# Patient Record
Sex: MALE | Race: WHITE | NOT HISPANIC OR LATINO | URBAN - METROPOLITAN AREA
[De-identification: names, ages, dates, MRNs, and addresses within clinical notes are randomized per-mention and may not be internally consistent; named-entity substitution may affect disease eponyms.]

---

## 2017-02-01 ENCOUNTER — INPATIENT (INPATIENT)
Facility: HOSPITAL | Age: 52
LOS: 3 days | Discharge: HOME | DRG: 378 | End: 2017-02-05
Attending: INTERNAL MEDICINE | Admitting: INTERNAL MEDICINE
Payer: MEDICAID

## 2017-02-01 VITALS
OXYGEN SATURATION: 99 % | TEMPERATURE: 98 F | HEART RATE: 101 BPM | RESPIRATION RATE: 18 BRPM | DIASTOLIC BLOOD PRESSURE: 95 MMHG | SYSTOLIC BLOOD PRESSURE: 148 MMHG

## 2017-02-01 PROCEDURE — 99285 EMERGENCY DEPT VISIT HI MDM: CPT | Mod: 25

## 2017-02-01 PROCEDURE — 93010 ELECTROCARDIOGRAM REPORT: CPT

## 2017-02-01 RX ORDER — PANTOPRAZOLE SODIUM 20 MG/1
40 TABLET, DELAYED RELEASE ORAL ONCE
Qty: 0 | Refills: 0 | Status: COMPLETED | OUTPATIENT
Start: 2017-02-01 | End: 2017-02-01

## 2017-02-01 NOTE — ED PROVIDER NOTE - OBJECTIVE STATEMENT
51M with HLD and PMH of GERD presents to [facility] ED with 2 day history of black, tarry stools. Patient noticed dark stools 2 days ago mixed with normal stool. Patient reports that at 2 AM this morning he began to notice liquid, black, tarry stool and has had 8 bowel movements of the same consistency since then. Patient reports no associated n/v or BRBPR. Patient reports that he has been tolerating a diet, but does endorse feeling lightheaded and fatigued today. Patient reports his last drink was about 10 days ago while on a business trip to [facility]. He used to drink and smoke regularly years ago, but stopped due to problems with GERD. Reports taking Prilosec in the past, but no longer takes it and no longer experiences symptoms of acid reflux. Patient reports that he had a CT scan in the past but never had an endoscopy. Reports normal annual exam with PCP 6 months ago.

## 2017-02-01 NOTE — ED ADULT NURSE NOTE - OBJECTIVE STATEMENT
pt received into spot 4 A&Ox3 ambulatory appears comfortable complaining of dark black stools today. States he's gone to the bathroom 7-8 times since 2pm today. Denies abd pain or blood thinner use no hx of abd surgeries no CP SOB. Pt admits to generalized weakness. 20G LAC labs sent awaiting MD schulz will monitor and reassess, pt in NAD

## 2017-02-01 NOTE — ED ADULT TRIAGE NOTE - CHIEF COMPLAINT QUOTE
pt c/o black stools since last night , pt is also c/o epigastric  pain , when pt asked if any history of ulcers pt states " I have been having  symptoms of ulcers ( indigestion , belching ), pt denies any nausea or vomiting

## 2017-02-01 NOTE — ED PROVIDER NOTE - ATTENDING CONTRIBUTION TO CARE
2 days of melena.  prior drinker/ history of gerd.  concern for gastritis/bleeding ulcer.  hemodynamically stable.  will start protonix, admit for trending hgb and GI consult/ possible endoscopy.  abd soft, non tender, well appearing

## 2017-02-01 NOTE — ED PROVIDER NOTE - MEDICAL DECISION MAKING DETAILS
51M w/ PMH of HLD and GERD presents with 2 day history of black tarry stools concerning for upper GI bleed 2/2 to ulcer   - PMH of GERD, 2 day history of melena and heme-occult positive on exam, highly suspicious for upper GI bleed  - Less likely to be hemorrhoids or lower GI source of bleed given tarry nature of stools, no BRBPR, and no masses palpated on rectal exam  - Admit to medicine for monitoring and further evaluation of GI bleeding   - Hemodynamically stable, can admit to regional level of care

## 2017-02-02 DIAGNOSIS — E78.5 HYPERLIPIDEMIA, UNSPECIFIED: ICD-10-CM

## 2017-02-02 DIAGNOSIS — K92.1 MELENA: ICD-10-CM

## 2017-02-02 LAB
ANION GAP SERPL CALC-SCNC: 8 MMOL/L — LOW (ref 9–16)
BLD GP AB SCN SERPL QL: NEGATIVE — SIGNIFICANT CHANGE UP
BLD GP AB SCN SERPL QL: NEGATIVE — SIGNIFICANT CHANGE UP
BUN SERPL-MCNC: 33 MG/DL — HIGH (ref 7–23)
CALCIUM SERPL-MCNC: 7.7 MG/DL — LOW (ref 8.5–10.5)
CHLORIDE SERPL-SCNC: 109 MMOL/L — HIGH (ref 96–108)
CHOLEST SERPL-MCNC: 148 MG/DL — SIGNIFICANT CHANGE UP
CO2 SERPL-SCNC: 24 MMOL/L — SIGNIFICANT CHANGE UP (ref 22–31)
CREAT SERPL-MCNC: 0.9 MG/DL — SIGNIFICANT CHANGE UP (ref 0.5–1.3)
FERRITIN SERPL-MCNC: 52.9 NG/ML — SIGNIFICANT CHANGE UP (ref 26–388)
FOLATE SERPL-MCNC: 11.9 NG/ML — SIGNIFICANT CHANGE UP (ref 4.8–24.2)
GLUCOSE SERPL-MCNC: 90 MG/DL — SIGNIFICANT CHANGE UP (ref 70–99)
HCT VFR BLD CALC: 28.3 % — LOW (ref 39–50)
HCT VFR BLD CALC: 29.9 % — LOW (ref 39–50)
HCT VFR BLD CALC: 32.5 % — LOW (ref 39–50)
HDLC SERPL-MCNC: 31 MG/DL — LOW
HGB BLD-MCNC: 10.2 G/DL — LOW (ref 13–17)
HGB BLD-MCNC: 11 G/DL — LOW (ref 13–17)
HGB BLD-MCNC: 9.4 G/DL — LOW (ref 13–17)
IRON SATN MFR SERPL: 25 % — LOW (ref 26–39)
IRON SATN MFR SERPL: 63 UG/DL — LOW (ref 65–175)
LIPID PNL WITH DIRECT LDL SERPL: 90 MG/DL — SIGNIFICANT CHANGE UP
MAGNESIUM SERPL-MCNC: 1.9 MG/DL — SIGNIFICANT CHANGE UP (ref 1.6–2.4)
MCHC RBC-ENTMCNC: 29.7 PG — SIGNIFICANT CHANGE UP (ref 27–34)
MCHC RBC-ENTMCNC: 29.9 PG — SIGNIFICANT CHANGE UP (ref 27–34)
MCHC RBC-ENTMCNC: 30 PG — SIGNIFICANT CHANGE UP (ref 27–34)
MCHC RBC-ENTMCNC: 33.2 G/DL — SIGNIFICANT CHANGE UP (ref 32–36)
MCHC RBC-ENTMCNC: 33.8 G/DL — SIGNIFICANT CHANGE UP (ref 32–36)
MCHC RBC-ENTMCNC: 34.1 G/DL — SIGNIFICANT CHANGE UP (ref 32–36)
MCV RBC AUTO: 87.7 FL — SIGNIFICANT CHANGE UP (ref 80–100)
MCV RBC AUTO: 88.6 FL — SIGNIFICANT CHANGE UP (ref 80–100)
MCV RBC AUTO: 89.6 FL — SIGNIFICANT CHANGE UP (ref 80–100)
PLATELET # BLD AUTO: 165 K/UL — SIGNIFICANT CHANGE UP (ref 150–400)
PLATELET # BLD AUTO: 177 K/UL — SIGNIFICANT CHANGE UP (ref 150–400)
PLATELET # BLD AUTO: 209 K/UL — SIGNIFICANT CHANGE UP (ref 150–400)
POTASSIUM SERPL-MCNC: 3.7 MMOL/L — SIGNIFICANT CHANGE UP (ref 3.5–5.3)
POTASSIUM SERPL-SCNC: 3.7 MMOL/L — SIGNIFICANT CHANGE UP (ref 3.5–5.3)
RBC # BLD: 3.16 M/UL — LOW (ref 4.2–5.8)
RBC # BLD: 3.41 M/UL — LOW (ref 4.2–5.8)
RBC # BLD: 3.67 M/UL — LOW (ref 4.2–5.8)
RBC # FLD: 13 % — SIGNIFICANT CHANGE UP (ref 10.3–16.9)
RBC # FLD: 13.1 % — SIGNIFICANT CHANGE UP (ref 10.3–16.9)
RBC # FLD: 13.4 % — SIGNIFICANT CHANGE UP (ref 10.3–16.9)
RH IG SCN BLD-IMP: POSITIVE — SIGNIFICANT CHANGE UP
RH IG SCN BLD-IMP: POSITIVE — SIGNIFICANT CHANGE UP
SODIUM SERPL-SCNC: 141 MMOL/L — SIGNIFICANT CHANGE UP (ref 135–145)
TIBC SERPL-MCNC: 249 UG/DL — LOW (ref 250–450)
TOTAL CHOLESTEROL/HDL RATIO MEASUREMENT: 4.8 RATIO — SIGNIFICANT CHANGE UP
TRIGL SERPL-MCNC: 136 MG/DL — SIGNIFICANT CHANGE UP
TSH SERPL-MCNC: 1.8 UIU/ML — SIGNIFICANT CHANGE UP (ref 0.35–4.94)
VIT B12 SERPL-MCNC: 514 PG/ML — SIGNIFICANT CHANGE UP (ref 243–894)
WBC # BLD: 5 K/UL — SIGNIFICANT CHANGE UP (ref 3.8–10.5)
WBC # BLD: 5 K/UL — SIGNIFICANT CHANGE UP (ref 3.8–10.5)
WBC # BLD: 6.5 K/UL — SIGNIFICANT CHANGE UP (ref 3.8–10.5)
WBC # FLD AUTO: 5 K/UL — SIGNIFICANT CHANGE UP (ref 3.8–10.5)
WBC # FLD AUTO: 5 K/UL — SIGNIFICANT CHANGE UP (ref 3.8–10.5)
WBC # FLD AUTO: 6.5 K/UL — SIGNIFICANT CHANGE UP (ref 3.8–10.5)

## 2017-02-02 PROCEDURE — 93010 ELECTROCARDIOGRAM REPORT: CPT

## 2017-02-02 PROCEDURE — 44366 SMALL BOWEL ENDOSCOPY: CPT | Mod: GC

## 2017-02-02 PROCEDURE — 99255 IP/OBS CONSLTJ NEW/EST HI 80: CPT | Mod: 25,GC

## 2017-02-02 PROCEDURE — 99223 1ST HOSP IP/OBS HIGH 75: CPT

## 2017-02-02 RX ORDER — SODIUM CHLORIDE 9 MG/ML
1000 INJECTION INTRAMUSCULAR; INTRAVENOUS; SUBCUTANEOUS
Qty: 0 | Refills: 0 | Status: DISCONTINUED | OUTPATIENT
Start: 2017-02-02 | End: 2017-02-02

## 2017-02-02 RX ORDER — PANTOPRAZOLE SODIUM 20 MG/1
40 TABLET, DELAYED RELEASE ORAL
Qty: 0 | Refills: 0 | Status: DISCONTINUED | OUTPATIENT
Start: 2017-02-02 | End: 2017-02-02

## 2017-02-02 RX ORDER — PANTOPRAZOLE SODIUM 20 MG/1
8 TABLET, DELAYED RELEASE ORAL
Qty: 80 | Refills: 0 | Status: DISCONTINUED | OUTPATIENT
Start: 2017-02-02 | End: 2017-02-04

## 2017-02-02 RX ORDER — SODIUM CHLORIDE 9 MG/ML
1000 INJECTION INTRAMUSCULAR; INTRAVENOUS; SUBCUTANEOUS ONCE
Qty: 0 | Refills: 0 | Status: COMPLETED | OUTPATIENT
Start: 2017-02-02 | End: 2017-02-02

## 2017-02-02 RX ORDER — SODIUM CHLORIDE 9 MG/ML
1000 INJECTION INTRAMUSCULAR; INTRAVENOUS; SUBCUTANEOUS
Qty: 0 | Refills: 0 | Status: DISCONTINUED | OUTPATIENT
Start: 2017-02-02 | End: 2017-02-04

## 2017-02-02 RX ADMIN — PANTOPRAZOLE SODIUM 40 MILLIGRAM(S): 20 TABLET, DELAYED RELEASE ORAL at 00:11

## 2017-02-02 RX ADMIN — SODIUM CHLORIDE 2000 MILLILITER(S): 9 INJECTION INTRAMUSCULAR; INTRAVENOUS; SUBCUTANEOUS at 01:25

## 2017-02-02 RX ADMIN — PANTOPRAZOLE SODIUM 10 MG/HR: 20 TABLET, DELAYED RELEASE ORAL at 12:48

## 2017-02-02 RX ADMIN — SODIUM CHLORIDE 125 MILLILITER(S): 9 INJECTION INTRAMUSCULAR; INTRAVENOUS; SUBCUTANEOUS at 03:16

## 2017-02-02 RX ADMIN — SODIUM CHLORIDE 125 MILLILITER(S): 9 INJECTION INTRAMUSCULAR; INTRAVENOUS; SUBCUTANEOUS at 12:49

## 2017-02-02 RX ADMIN — PANTOPRAZOLE SODIUM 10 MG/HR: 20 TABLET, DELAYED RELEASE ORAL at 03:17

## 2017-02-02 RX ADMIN — SODIUM CHLORIDE 125 MILLILITER(S): 9 INJECTION INTRAMUSCULAR; INTRAVENOUS; SUBCUTANEOUS at 00:11

## 2017-02-02 NOTE — H&P ADULT. - PROBLEM SELECTOR PLAN 2
Pt reports being off crestor x months. Self stopped.  - lipid panel in AM    PPx. SCDs only    FEN. IVF as above. Replete electrlytes as above. NPO    Dispo. Pending clinical improvement Pt reports being off crestor x months. Self stopped.  - lipid panel in AM    PPx. SCDs only    FEN. IVF as above. Replete electrolytes as above. NPO    Dispo. Pending clinical improvement

## 2017-02-02 NOTE — H&P ADULT. - ASSESSMENT
52 yo M PMH HLD (self stopped crestor 5 months ago), GERD (dx 5 years ago, on prilosec at that time, stopped all meds 5 years ago, changed lifestyle/no coffee/etoh to tx GERD), no recent GIB or GERD Sx, remote heavy etoh/drug use late 1990s and 2/2 PTSD after 9/2011 (stopped all illicit substances > 10 years ago) presents with black stool x 1 day. Orthostatics negative though HR increased 73 to 91 per report. Hgb 12.8. BUN in 40s range. Guaic + in ED, black stool. NAD on exam.

## 2017-02-02 NOTE — PROGRESS NOTE ADULT - PROBLEM SELECTOR PLAN 2
GI consult, pending recc's - previous diagnosis of hyperlipidemia  - recently self-discontinued crestor due to insurance issues  - continue w crestor and follow up w PMD as needed

## 2017-02-02 NOTE — H&P ADULT. - ATTENDING COMMENTS
51M hx HLD, GERD, generally healthy otherwise who presents with 1d history of melena with black, formed stools. Pt had about 5 episodes on day of presentation. No other symptoms except mild fatigue. No NSAID or AC use. He did drink relatively heavily on a recent business trip, which he does not normally do (was a remote heavy EtOH, drug abuser). In ED, pt had guaiac + black stool on rectal exam. Orthostatics borderline negative.  Vitals and exam as above  Labs reviewed  A/P: 51M with melena and suspected UGIB. Will continue Protonix gtt and check serial CBCs. Active T&S. No need for transfusion at this time. GI consult for possible EGD today.

## 2017-02-02 NOTE — CONSULT NOTE ADULT - SUBJECTIVE AND OBJECTIVE BOX
HPI:    52 yo M PMH HLD , GERD (dx 5 years ago, on prilosec self d/c'd 2/2 to lifestyle mods  presents with black stool x 1 day. Experienced about 3 episodes black stool in past day; started at streaks of black and progressed to black tarry. Denies abd, n/v. BRBPR, no diarrhea. Endorses  +mild fatigue over last day, which has since resolved. Pt does indorse +intermittant esophageal pain in the pasts but none currently. Pt describes this as pain in the "esophagus" that he recognizes from his past GERD Sx.. Does not drink coffee/etoh regularly. No NSAID or AC use.     Of note, In past week he returned from business trip to [facility] where he engaged in heavy drinking etoh which is unusual for him; reports 1 bottle of wine nightly though unclear exact amount x 2-3 nights during trip. He does not drink etoh heavily or regularly and describes this as anomalous for him. 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 Smoked 1 pack of cigarettes during month of December 2016, otherwise nonsmoker. Last meal 2/1 11:00 am.     hgb 12.8 --> 11.0 --> 10.2      PAST MEDICAL & SURGICAL HISTORY:  GERD (gastroesophageal reflux disease)  Hyperlipidemia          MEDICATIONS  (STANDING):  sodium chloride 0.9%. 1000milliLiter(s) IV Continuous <Continuous>  pantoprazole Infusion 8mG/Hr IV Continuous <Continuous>    MEDICATIONS  (PRN):      Allergies    penicillin (Unknown)    Intolerances        SOCIAL HISTORY: social drinker/smoker, former drug use.     FAMILY HISTORY:  Family history of Hodgkins disease (Mother)      Vital Signs Last 24 Hrs  T(C): 36.8, Max: 36.9 (02-01 @ 22:43)  T(F): 98.3, Max: 98.4 (02-01 @ 22:43)  HR: 72 (68 - 101)  BP: 121/68 (100/61 - 148/95)  BP(mean): --  RR: 16 (16 - 18)  SpO2: 96% (96% - 99%)    PHYSICAL EXAM:    GEN: resting comfortably in bed, nad, AOx3  HEENT: anicteric, no pallor  CHEST: no w/r/r  CVS: no m/r/g  ABD: soft, nt, nd, bs+  RECTAL: +melena no hemorrhoids                LABS:                        10.2   5.0   )-----------( 177      ( 02 Feb 2017 06:18 )             29.9     02 Feb 2017 06:18    141    |  109    |  33     ----------------------------<  90     3.7     |  24     |  0.90     Ca    7.7        02 Feb 2017 06:18  Mg     1.9       02 Feb 2017 06:18    TPro  6.8    /  Alb  3.5    /  TBili  0.3    /  DBili  x      /  AST  23     /  ALT  44     /  AlkPhos  76     01 Feb 2017 23:12    PT/INR - ( 01 Feb 2017 23:12 )   PT: 11.0 sec;   INR: 0.99          PTT - ( 01 Feb 2017 23:12 )  PTT:27.8 sec      RADIOLOGY & ADDITIONAL STUDIES:

## 2017-02-02 NOTE — PROGRESS NOTE ADULT - SUBJECTIVE AND OBJECTIVE BOX
INTERVAL HPI/OVERNIGHT EVENTS:  No changes overnight. Vitals WNL.    VITAL SIGNS:  T(F): 98.3  HR: 72  BP: 121/68  RR: 16  SpO2: 96%  Wt(kg): --    PHYSICAL EXAM:    Constitutional: well-appearing, well-nourished  Eyes: PERRLA, EOM intact  ENMT: moist oral mucosa,   Neck: supple, no JVD  Respiratory: lungs CTAB, no ronchi, rales, wheezing  Cardiovascular: RRR, normal s1s2  Gastrointestinal: + BS, soft, non tender, non distended, no guarding or rebound, no hepatomegaly, no splenomegaly  Extremities:   Vascular:   Neurological: no focal deficits, AAO x3  Musculoskeletal: ROM intact, no joint swelling or deformity    MEDICATIONS  (STANDING):  sodium chloride 0.9%. 1000milliLiter(s) IV Continuous <Continuous>  pantoprazole Infusion 8mG/Hr IV Continuous <Continuous>    MEDICATIONS  (PRN):      Allergies    penicillin (Unknown)    Intolerances        LABS:                        10.2   5.0   )-----------( 177      ( 02 Feb 2017 06:18 )             29.9     02 Feb 2017 06:18    141    |  109    |  33     ----------------------------<  90     3.7     |  24     |  0.90     Ca    7.7        02 Feb 2017 06:18  Mg     1.9       02 Feb 2017 06:18    TPro  6.8    /  Alb  3.5    /  TBili  0.3    /  DBili  x      /  AST  23     /  ALT  44     /  AlkPhos  76     01 Feb 2017 23:12    PT/INR - ( 01 Feb 2017 23:12 )   PT: 11.0 sec;   INR: 0.99          PTT - ( 01 Feb 2017 23:12 )  PTT:27.8 sec      RADIOLOGY & ADDITIONAL TESTS: S  INTERVAL HPI/OVERNIGHT EVENTS:  No changes overnight. Vitals WNL.    This morning, denies any further episodes of melena. Esophageal pain resolved. No fevers/chills, CP, SOB, palpitations, n/v, palpitations, pain.  ;  O  VITAL SIGNS:  T(F): 98.3  HR: 72  BP: 121/68  RR: 16  SpO2: 96%  Wt(kg): --    PHYSICAL EXAM:    Constitutional: well-appearing, well-nourished  Eyes: PERRLA, EOM intact  ENMT: moist oral mucosa,   Neck: supple, no JVD  Respiratory: lungs CTAB, no ronchi, rales, wheezing  Cardiovascular: RRR, normal s1s2  Gastrointestinal: + BS, soft, non tender, non distended, no guarding or rebound, no hepatomegaly, no splenomegaly  Extremities: warm, well-perfused, no edema, clubbing or cyanosis  Neurological: no focal deficits, AAO x3  Musculoskeletal: ROM intact, no joint swelling or deformity    MEDICATIONS  (STANDING):  sodium chloride 0.9%. 1000milliLiter(s) IV Continuous <Continuous>  pantoprazole Infusion 8mG/Hr IV Continuous <Continuous>    MEDICATIONS  (PRN):      Allergies    penicillin (Unknown)    Intolerances        LABS:                        10.2   5.0   )-----------( 177      ( 02 Feb 2017 06:18 )             29.9     02 Feb 2017 06:18    141    |  109    |  33     ----------------------------<  90     3.7     |  24     |  0.90     Ca    7.7        02 Feb 2017 06:18  Mg     1.9       02 Feb 2017 06:18    TPro  6.8    /  Alb  3.5    /  TBili  0.3    /  DBili  x      /  AST  23     /  ALT  44     /  AlkPhos  76     01 Feb 2017 23:12    PT/INR - ( 01 Feb 2017 23:12 )   PT: 11.0 sec;   INR: 0.99          PTT - ( 01 Feb 2017 23:12 )  PTT:27.8 sec

## 2017-02-02 NOTE — PROGRESS NOTE ADULT - ASSESSMENT
Mr. Smith is a 50yo M w hx of GERD and HLD who was admitted for melena of unclear etiology. Mr. Smith is a 52yo M w hx of GERD and HLD who was admitted for melena possibly due to gastritis vs ulcer.

## 2017-02-02 NOTE — H&P ADULT. - PROBLEM SELECTOR PLAN 1
Concerning for UGIB especially given H/O etoh and GERD. Labs reveal normocytic anemia, hgb 12.8. Orthostatics negative, NAD on exam. Initial tachycardia may be 2/2 dehydration as pt dry on exam vs bleed. Present exam suggests resolving UGIB, unlikely brisk active bleed.  - GI cx in AM  - trend CBC  - maintain 2 type and screen, 2 large bore IV  - IV PPI bid  - NPO  - c/w maintenance IVF overnight Concerning for UGIB especially given H/O etoh and GERD. Labs reveal normocytic anemia, hgb 12.8. Elevated BUN suggests bleed. Orthostatics negative, NAD on exam. Initial tachycardia may be 2/2 dehydration as pt dry on exam vs bleed. Present exam suggests resolving UGIB, unlikely brisk active bleed but maintain high index of suspicion.  - GI cx in AM  - trend CBC overnight  - maintain 2 type and screen, 2 large bore IV  - PPI gtt  - NPO  - c/w maintenance IVF overnight

## 2017-02-02 NOTE — PROGRESS NOTE ADULT - PROBLEM SELECTOR PLAN 1
- presented w 1 day hx of melena; VS stable  - etiology likely 2/2 to gastritis vs peptic ulcer  - GI consulted; EGD planned for today  - maintain type and screen; monitor hemoglobin - presented w 1 day hx of melena; VS stable  - etiology likely 2/2 to gastritis vs peptic ulcer  - GI consulted; EGD planned for today  - maintain type and screen; monitor hemoglobin (12.8 --> 11.0 --> 10.2)

## 2017-02-02 NOTE — CONSULT NOTE ADULT - ASSESSMENT
51 year old male with hx of GERD (treated with lifestyle modification) with recent binge drinking presents with melena x1 day and elevated BUN.  Concern for UGIB, will plan for endoscopic evaluation.     1. UGIB  -NPO  -IV PPI drip  -Monitor CBCs, transfuse to hgb >7  -Avoid NSAIDS  -EGD today    GI following

## 2017-02-02 NOTE — H&P ADULT. - HISTORY OF PRESENT ILLNESS
52 yo M PMH HLD (self stopped crestor 5 months ago), GERD (dx 5 years ago, on prilosec at that time, stopped all meds 5 years ago, changed lifestyle/no coffee/etoh to tx GERD), no recent GIB or GERD Sx, remote heavy etoh/drug use late 1990s and 2/2 PTSD after 9/2011 (stopped all illicit substances > 10 years ago) presents with black stool x 1 day. Experienced about 5 episodes black stool in past day, no BRBPR, no diarrhea, formed stool. +mild fatigue in this timeperiod which he states resolved with IVF in ED, no longer fatigued. No HA/dizziness/CP/SOB/abd pain/n/v/diarrhea/dysuria/ No constipation. No recent illness. Does not drink coffee/etoh regularly. No NSAID or AC use. In past week he returned from business trip to [facility] where he engaged in heavy drinking etoh which is unusual for him; reports 1 bottle of wine nightly x 2-3 nights, walking home mildly inebriated fo hotel room. He does not drink etoh heavily or regularly and describes this as anomalous for him. 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 Smoked 1 pack of cigarrettes during month of December 2016, otherwise nonsmoker.    In ED: 98.4, 101 to 84, 148/95 to 120/70, 18, 99% RA. Orthostatics negative though HR increased 73 to 91 per report. Hgb 12.8. BUN in 40s range. Received IV protonix 40,  cc/hr, 1L IV NS bolus. Guaic + in ED, black stool. 50 yo M PMH HLD (self stopped crestor 5 months ago), GERD (dx 5 years ago, on prilosec at that time, stopped all meds 5 years ago, changed lifestyle/no coffee/etoh to tx GERD), no recent GIB or GERD Sx, remote heavy etoh/drug use late 1990s and 2/2 PTSD after 9/2011 (stopped all illicit substances > 10 years ago) presents with black stool x 1 day. Experienced about 5 episodes black stool in past day, no BRBPR, no diarrhea, formed stool. +mild fatigue in this timeperiod which he states resolved with IVF in ED, no longer fatigued. +intermittant esophageal pain during bowel movements, none currently. Pt describes this as pain in the "esophagus" that he recognizes from his past GERD Sx. No HA/dizziness/CP/SOB/abd pain/n/v/diarrhea/dysuria/ No constipation. No recent illness. Does not drink coffee/etoh regularly. No NSAID or AC use. In past week he returned from business trip to [facility] where he engaged in heavy drinking etoh which is unusual for him; reports 1 bottle of wine nightly though unclear exact amount x 2-3 nights during trip, walking home inebriated to hotel room. He does not drink etoh heavily or regularly and describes this as anomalous for him. 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 Smoked 1 pack of cigarettes during month of December 2016, otherwise nonsmoker.    In ED: 98.4, 101 to 84, 148/95 to 120/70, 18, 99% RA. Orthostatics negative though HR increased 73 to 91 per report. Hgb 12.8. BUN in 40s range. Received IV protonix 40,  cc/hr, 1L IV NS bolus. Guaic + in ED, black stool.

## 2017-02-03 DIAGNOSIS — Z29.9 ENCOUNTER FOR PROPHYLACTIC MEASURES, UNSPECIFIED: ICD-10-CM

## 2017-02-03 LAB
ANION GAP SERPL CALC-SCNC: 5 MMOL/L — LOW (ref 9–16)
BUN SERPL-MCNC: 12 MG/DL — SIGNIFICANT CHANGE UP (ref 7–23)
CALCIUM SERPL-MCNC: 8.3 MG/DL — LOW (ref 8.5–10.5)
CHLORIDE SERPL-SCNC: 110 MMOL/L — HIGH (ref 96–108)
CO2 SERPL-SCNC: 28 MMOL/L — SIGNIFICANT CHANGE UP (ref 22–31)
CREAT SERPL-MCNC: 1.12 MG/DL — SIGNIFICANT CHANGE UP (ref 0.5–1.3)
GLUCOSE SERPL-MCNC: 91 MG/DL — SIGNIFICANT CHANGE UP (ref 70–99)
HCT VFR BLD CALC: 26.3 % — LOW (ref 39–50)
HCT VFR BLD CALC: 27.3 % — LOW (ref 39–50)
HGB BLD-MCNC: 8.9 G/DL — LOW (ref 13–17)
HGB BLD-MCNC: 9.1 G/DL — LOW (ref 13–17)
MCHC RBC-ENTMCNC: 29.8 PG — SIGNIFICANT CHANGE UP (ref 27–34)
MCHC RBC-ENTMCNC: 30.1 PG — SIGNIFICANT CHANGE UP (ref 27–34)
MCHC RBC-ENTMCNC: 33.3 G/DL — SIGNIFICANT CHANGE UP (ref 32–36)
MCHC RBC-ENTMCNC: 33.8 G/DL — SIGNIFICANT CHANGE UP (ref 32–36)
MCV RBC AUTO: 88.9 FL — SIGNIFICANT CHANGE UP (ref 80–100)
MCV RBC AUTO: 89.5 FL — SIGNIFICANT CHANGE UP (ref 80–100)
PLATELET # BLD AUTO: 162 K/UL — SIGNIFICANT CHANGE UP (ref 150–400)
PLATELET # BLD AUTO: 171 K/UL — SIGNIFICANT CHANGE UP (ref 150–400)
POTASSIUM SERPL-MCNC: 3.6 MMOL/L — SIGNIFICANT CHANGE UP (ref 3.5–5.3)
POTASSIUM SERPL-SCNC: 3.6 MMOL/L — SIGNIFICANT CHANGE UP (ref 3.5–5.3)
RBC # BLD: 2.96 M/UL — LOW (ref 4.2–5.8)
RBC # BLD: 3.05 M/UL — LOW (ref 4.2–5.8)
RBC # FLD: 13.3 % — SIGNIFICANT CHANGE UP (ref 10.3–16.9)
RBC # FLD: 13.4 % — SIGNIFICANT CHANGE UP (ref 10.3–16.9)
SODIUM SERPL-SCNC: 143 MMOL/L — SIGNIFICANT CHANGE UP (ref 135–145)
WBC # BLD: 3.1 K/UL — LOW (ref 3.8–10.5)
WBC # BLD: 3.5 K/UL — LOW (ref 3.8–10.5)
WBC # FLD AUTO: 3.1 K/UL — LOW (ref 3.8–10.5)
WBC # FLD AUTO: 3.5 K/UL — LOW (ref 3.8–10.5)

## 2017-02-03 PROCEDURE — 99233 SBSQ HOSP IP/OBS HIGH 50: CPT

## 2017-02-03 RX ORDER — SODIUM CHLORIDE 9 MG/ML
500 INJECTION INTRAMUSCULAR; INTRAVENOUS; SUBCUTANEOUS ONCE
Qty: 0 | Refills: 0 | Status: COMPLETED | OUTPATIENT
Start: 2017-02-03 | End: 2017-02-03

## 2017-02-03 RX ADMIN — PANTOPRAZOLE SODIUM 10 MG/HR: 20 TABLET, DELAYED RELEASE ORAL at 05:30

## 2017-02-03 RX ADMIN — SODIUM CHLORIDE 125 MILLILITER(S): 9 INJECTION INTRAMUSCULAR; INTRAVENOUS; SUBCUTANEOUS at 05:30

## 2017-02-03 RX ADMIN — PANTOPRAZOLE SODIUM 10 MG/HR: 20 TABLET, DELAYED RELEASE ORAL at 12:15

## 2017-02-03 RX ADMIN — SODIUM CHLORIDE 2000 MILLILITER(S): 9 INJECTION INTRAMUSCULAR; INTRAVENOUS; SUBCUTANEOUS at 05:30

## 2017-02-03 NOTE — DISCHARGE NOTE ADULT - PATIENT PORTAL LINK FT
“You can access the Follow[employer] Patient Portal, offered by [facility], by registering with the following website: [email or web address]”

## 2017-02-03 NOTE — DISCHARGE NOTE ADULT - HOSPITAL COURSE
Mr. Smith is a 52yo M w no significant medical hx who presented to ED w reports of black tarry stool and was subsequently found to have an actively oozing ulcer. Ulcer was clipped by GI and pt was monitored for bleeding. Diet was advanced as tolerated. 50yo M w hypothyroidism, HLD, GERD, remote heavy etoh/drug use late 1990s 2/2 PTSD after 9/2011 (stopped all illicit substances > 10 years ago) presented with black stool x 1 day. HD stable, afebrile on arrival, admitted to [facility]. Guaiac positive black stool on exam. Labs remarkable for elevated BUN and Hgb of 12.8. Started on protonix, s/p EGD with GI, found to have actively oozing ulcer (forest class 1B). Ulcer was clipped by GI and pt was monitored for bleeding. Diet was advanced as tolerated. H/H stable. Ready for discharge home with outpatient GI follow up. 52yo M w hypothyroidism, HLD, GERD, remote heavy etoh/drug use late 1990s 2/2 PTSD after 9/2011 (stopped all illicit substances > 10 years ago) presented with black stool x 1 day. HD stable, afebrile on arrival, admitted to [facility]. Guaiac positive black stool on exam. Labs remarkable for elevated BUN and Hgb of 12.8. Started on protonix, s/p EGD with GI, found to have actively oozing ulcer (forest class 1B). Ulcer was clipped by GI and pt was monitored for bleeding. Patient tolerating full diet. H/H stable, no BM after EGD. H.pylori IgA pending, ready for discharge home on oral PPI with outpatient GI follow up. 50yo M w hypothyroidism, HLD, GERD, remote heavy etoh/drug use late 1990s 2/2 PTSD after 9/2011 (stopped all illicit substances > 10 years ago) presented with black stool x 1 day. HD stable, afebrile on arrival, admitted to [facility]. Guaiac positive black stool on exam. Labs remarkable for elevated BUN and Hgb of 12.8. Started on protonix, s/p EGD with GI, found to have actively oozing ulcer (forest class 1B). Ulcer was clipped by GI and pt was monitored for bleeding. Patient tolerating full diet. H/H stable, no BM after EGD. H.pylori IgA pending, ready for discharge home on oral PPI BID with outpatient GI follow up. 50yo M w hypothyroidism, HLD, GERD, remote heavy etoh/drug use late 1990s 2/2 PTSD after 9/2011 (stopped all illicit substances > 10 years ago) presented with black stool x 1 day. HD stable, afebrile on arrival, admitted to [facility]. Guaiac positive black stool on exam. Labs remarkable for elevated BUN and Hgb of 12.8. Started on protonix, s/p EGD with GI, found to have actively oozing ulcer (forest class 1B). Ulcer was clipped by GI and pt was monitored for bleeding. Patient tolerating full diet. H/H stable, no BM after EGD. H.pylori IgA pending, ready for discharge home on oral PPI BID with outpatient GI follow up in 1 week with Dr. Connolly

## 2017-02-03 NOTE — DISCHARGE NOTE ADULT - MEDICATION SUMMARY - MEDICATIONS TO TAKE
I will START or STAY ON the medications listed below when I get home from the hospital:    pantoprazole 40 mg oral delayed release tablet  -- 1 tab(s) by mouth once a day (before a meal)  -- Indication: For Gastric ulcers I will START or STAY ON the medications listed below when I get home from the hospital:    pantoprazole 40 mg oral delayed release tablet  -- 1 tab(s) by mouth 2 times a day  -- Indication: For Duodenal Ulcers

## 2017-02-03 NOTE — PROGRESS NOTE ADULT - ASSESSMENT
51 year old male with hx of GERD (treated with lifestyle modification) with recent binge drinking presents with melena x1 day and elevated BUN.  S/P EGD with multiple gastric and duodenal ulcers s/p hemoclip placement of oozing ulcer (forest class Ib - 50% chance of rebleed)     1. UGIB 2/2 to multiple duodenal ulcers s/p hemoclip placement     -Monitor CBCs q 8hrs, transfuse to hgb >7  -Cont IV PPI for total of 72 hours  -serial CBC -- if stable can start on clear liquid diet   -Avoid NSAIDS      GI following

## 2017-02-03 NOTE — PROGRESS NOTE ADULT - SUBJECTIVE AND OBJECTIVE BOX
Pt seen and examined at bedside this afternoon. Feels well NAD. Reports improvement in fatigue. Pt does endorse one large melenic BM this am, but otherwise feels well.        MEDICATIONS:  MEDICATIONS  (STANDING):  sodium chloride 0.9%. 1000milliLiter(s) IV Continuous <Continuous>  pantoprazole Infusion 8mG/Hr IV Continuous <Continuous>    MEDICATIONS  (PRN):      Allergies    penicillin (Unknown)    Intolerances        Vital Signs Last 24 Hrs  T(C): 36.7, Max: 36.8 (02-03 @ 06:37)  T(F): 98, Max: 98.2 (02-03 @ 06:37)  HR: 81 (64 - 81)  BP: 130/74 (91/52 - 130/74)  BP(mean): --  RR: 16 (16 - 16)  SpO2: 98% (95% - 98%)    I & Os for current day (as of 02-03 @ 12:32)  =============================================  IN: 1350 ml / OUT: 0 ml / NET: 1350 ml      PHYSICAL EXAM:    General: Well developed; well nourished; in no acute distress  HEENT: MMM, conjunctiva and sclera clear  Gastrointestinal: Soft non-tender non-distended; Normal bowel sounds    LABS:      CBC Full  -  ( 03 Feb 2017 08:08 )  WBC Count : 3.1 K/uL  Hemoglobin : 8.9 g/dL  Hematocrit : 26.3 %  Platelet Count - Automated : 162 K/uL  Mean Cell Volume : 88.9 fL  Mean Cell Hemoglobin : 30.1 pg  Mean Cell Hemoglobin Concentration : 33.8 g/dL  Auto Neutrophil # : x  Auto Lymphocyte # : x  Auto Monocyte # : x  Auto Eosinophil # : x  Auto Basophil # : x  Auto Neutrophil % : x  Auto Lymphocyte % : x  Auto Monocyte % : x  Auto Eosinophil % : x  Auto Basophil % : x    02 Feb 2017 06:18    141    |  109    |  33     ----------------------------<  90     3.7     |  24     |  0.90     Ca    7.7        02 Feb 2017 06:18  Mg     1.9       02 Feb 2017 06:18    TPro  6.8    /  Alb  3.5    /  TBili  0.3    /  DBili  x      /  AST  23     /  ALT  44     /  AlkPhos  76     01 Feb 2017 23:12    PT/INR - ( 01 Feb 2017 23:12 )   PT: 11.0 sec;   INR: 0.99          PTT - ( 01 Feb 2017 23:12 )  PTT:27.8 sec                  RADIOLOGY & ADDITIONAL STUDIES (The following images were personally reviewed):

## 2017-02-03 NOTE — DISCHARGE NOTE ADULT - CARE PROVIDER_API CALL
Kg June), Internal Medicine  [address] [place] [address]  Phone: [phone]  Fax: [phone]    Yaya Connolly), Gastroenterology; Internal Medicine  [address] [place] [address]  Phone: [phone]  Fax: [phone]

## 2017-02-03 NOTE — PROGRESS NOTE ADULT - SUBJECTIVE AND OBJECTIVE BOX
S:  INTERVAL HPI/OVERNIGHT EVENTS:  No acute events overnight.    This morning pt hypotensive at 90/51, improved w 500cc bolus.    VITAL SIGNS:  T(F): 98  HR: 81  BP: 130/74  RR: 16  SpO2: 98%  Wt(kg): --    PHYSICAL EXAM:    Constitutional: cooperative, well appearing, well nourished  Eyes: PERRLA, EOM intact  ENMT: moist oral mucosa, no tonsilar erythema  Neck: supple, no jvd  Respiratory: lungs CTAB, no ronchi, rales, wheezes  Cardiovascular: RRR, normal S1S2  Gastrointestinal: +BS, abdomen soft, nontender, non distended, no rebound or guarding  Extremities: warm, well perfused  Vascular:   Neurological: AAO x 3, no focal deficits  Musculoskeletal: normal ROM, no joint deformity    MEDICATIONS  (STANDING):  sodium chloride 0.9%. 1000milliLiter(s) IV Continuous <Continuous>  pantoprazole Infusion 8mG/Hr IV Continuous <Continuous>    MEDICATIONS  (PRN):      Allergies    penicillin (Unknown)    Intolerances        LABS:                        8.9    3.1   )-----------( 162      ( 03 Feb 2017 08:08 )             26.3     02 Feb 2017 06:18    141    |  109    |  33     ----------------------------<  90     3.7     |  24     |  0.90     Ca    7.7        02 Feb 2017 06:18  Mg     1.9       02 Feb 2017 06:18    TPro  6.8    /  Alb  3.5    /  TBili  0.3    /  DBili  x      /  AST  23     /  ALT  44     /  AlkPhos  76     01 Feb 2017 23:12    PT/INR - ( 01 Feb 2017 23:12 )   PT: 11.0 sec;   INR: 0.99          PTT - ( 01 Feb 2017 23:12 )  PTT:27.8 sec

## 2017-02-03 NOTE — DISCHARGE NOTE ADULT - CARE PLAN
Principal Discharge DX:	Melena  Instructions for follow-up, activity and diet:	You were found to have an actively oozing ulcer which was clipped during the endoscopy. Please seek medical attention if you feel lightheaded, short of breath, continue to have black tarry stools or visualize bright red blood on stool. Follow up with gastroenterologist for outpatient management of ulcers.  Secondary Diagnosis:	Hyperlipidemia  Instructions for follow-up, activity and diet:	You have high cholesterol. Please continue to take Crestor as indicated and follow up with primary care doctor. Principal Discharge DX:	Melena  Goal:	Follow up with GI  Instructions for follow-up, activity and diet:	You were found to have an actively oozing ulcer which was clipped during the endoscopy. Please seek medical attention if you feel lightheaded, short of breath, continue to have black tarry stools or visualize bright red blood on stool. Follow up with gastroenterologist for outpatient management of ulcers.  Secondary Diagnosis:	Hyperlipidemia  Goal:	Continue crestor, follow up with PMD  Instructions for follow-up, activity and diet:	You have high cholesterol. Please continue to take Crestor as indicated and follow up with primary care doctor. Principal Discharge DX:	Melena  Goal:	Follow up with GI  Instructions for follow-up, activity and diet:	You were found to have an actively oozing ulcer which was clipped during the endoscopy. Please seek medical attention if you feel lightheaded, short of breath, continue to have black tarry stools or visualize bright red blood on stool. Continue to take your proton pump inhibitor as prescribed. Follow up with gastroenterologist for outpatient management of ulcers.  Secondary Diagnosis:	Hyperlipidemia  Goal:	Continue crestor, follow up with PMD  Instructions for follow-up, activity and diet:	You have high cholesterol. Please continue to take Crestor as indicated and follow up with primary care doctor. Principal Discharge DX:	Melena  Goal:	Follow up with GI  Instructions for follow-up, activity and diet:	You were found to have an actively oozing ulcer which was clipped during the endoscopy. Please seek medical attention if you feel lightheaded, short of breath, continue to have black tarry stools or visualize bright red blood on stool. Continue to take your proton pump inhibitor one time daily. Follow up with gastroenterologist for outpatient management of ulcers.  Secondary Diagnosis:	Hyperlipidemia  Goal:	Continue crestor, follow up with PMD  Instructions for follow-up, activity and diet:	You have high cholesterol. Please continue to take Crestor as indicated and follow up with primary care doctor. Principal Discharge DX:	Melena  Goal:	Follow up with GI  Instructions for follow-up, activity and diet:	You were found to have an actively oozing ulcer which was clipped during the endoscopy. Please seek medical attention if you feel lightheaded, short of breath, continue to have black tarry stools or visualize bright red blood on stool. Continue to take your proton pump inhibitor one time daily. Follow up with gastroenterologist for outpatient management of ulcers. We tested you for a bacteria that can cause ulcers called H. Pylori. Please follow up with Dr. Connolly as outpatient for those results as outpatient.  Secondary Diagnosis:	Hyperlipidemia  Goal:	Continue crestor, follow up with PMD  Instructions for follow-up, activity and diet:	You have high cholesterol. Please continue to take Crestor as indicated and follow up with primary care doctor. Principal Discharge DX:	Melena  Goal:	Follow up with GI  Instructions for follow-up, activity and diet:	You were found to have an actively oozing ulcer which was clipped during the endoscopy. Please seek medical attention if you feel lightheaded, short of breath, continue to have black tarry stools or visualize bright red blood on stool. Continue to take your proton pump inhibitor two times daily. Follow up with gastroenterologist for outpatient management of ulcers. We tested you for a bacteria that can cause ulcers called H. Pylori. Please follow up with Dr. Connolly as outpatient for those results as outpatient.  Secondary Diagnosis:	Hyperlipidemia  Goal:	Continue crestor, follow up with PMD  Instructions for follow-up, activity and diet:	You have high cholesterol. Please continue to take Crestor as indicated and follow up with primary care doctor. Principal Discharge DX:	Melena  Goal:	Follow up with GI  Instructions for follow-up, activity and diet:	You were found to have an actively oozing ulcer which was clipped during the endoscopy. Please seek medical attention if you feel lightheaded, short of breath, continue to have black tarry stools or visualize bright red blood on stool. Continue to take your proton pump inhibitor two times daily. Follow up with gastroenterologist for outpatient management of ulcers. We tested you for a bacteria that can cause ulcers called H. Pylori. Please follow up with Dr. Connolly as outpatient for those results as outpatient in one week. Please call for an appointment  Secondary Diagnosis:	Hyperlipidemia  Goal:	Continue crestor, follow up with PMD  Instructions for follow-up, activity and diet:	You have high cholesterol. Please continue to take Crestor as indicated and follow up with primary care doctor.

## 2017-02-03 NOTE — PROGRESS NOTE ADULT - PROBLEM SELECTOR PLAN 1
- s/p upper gi endoscopy and hemoclip  - continue protonix 8mg/hr drip   - monitor for signs and sxs of active bleeding: Hgb, blood pressure, and BUN  - pt not orthostatic s/p upper gi endoscopy and hemoclip, multiple ulcers found, risk of rebleeding. Also unclear etiology, ZES?  - continue protonix 8mg/hr drip   - monitor for signs and sxs of active bleeding: CBC Q8-12H, will f/u with BMP  - pt not orthostatic

## 2017-02-03 NOTE — PROGRESS NOTE ADULT - ASSESSMENT
Mr. Smith is a 50 yo M w no significant medical hx who presented w 1 day hx of melena 2/2 actively bleeding ulcers s/p upper GI endoscopy and hemoclip.

## 2017-02-03 NOTE — DISCHARGE NOTE ADULT - PLAN OF CARE
You were found to have an actively oozing ulcer which was clipped during the endoscopy. Please seek medical attention if you feel lightheaded, short of breath, continue to have black tarry stools or visualize bright red blood on stool. Follow up with gastroenterologist for outpatient management of ulcers. You have high cholesterol. Please continue to take Crestor as indicated and follow up with primary care doctor. Follow up with GI Continue crestor, follow up with PMD You were found to have an actively oozing ulcer which was clipped during the endoscopy. Please seek medical attention if you feel lightheaded, short of breath, continue to have black tarry stools or visualize bright red blood on stool. Continue to take your proton pump inhibitor as prescribed. Follow up with gastroenterologist for outpatient management of ulcers. You were found to have an actively oozing ulcer which was clipped during the endoscopy. Please seek medical attention if you feel lightheaded, short of breath, continue to have black tarry stools or visualize bright red blood on stool. Continue to take your proton pump inhibitor one time daily. Follow up with gastroenterologist for outpatient management of ulcers. You were found to have an actively oozing ulcer which was clipped during the endoscopy. Please seek medical attention if you feel lightheaded, short of breath, continue to have black tarry stools or visualize bright red blood on stool. Continue to take your proton pump inhibitor one time daily. Follow up with gastroenterologist for outpatient management of ulcers. We tested you for a bacteria that can cause ulcers called H. Pylori. Please follow up with Dr. Connolly as outpatient for those results as outpatient. You were found to have an actively oozing ulcer which was clipped during the endoscopy. Please seek medical attention if you feel lightheaded, short of breath, continue to have black tarry stools or visualize bright red blood on stool. Continue to take your proton pump inhibitor two times daily. Follow up with gastroenterologist for outpatient management of ulcers. We tested you for a bacteria that can cause ulcers called H. Pylori. Please follow up with Dr. Connolly as outpatient for those results as outpatient. You were found to have an actively oozing ulcer which was clipped during the endoscopy. Please seek medical attention if you feel lightheaded, short of breath, continue to have black tarry stools or visualize bright red blood on stool. Continue to take your proton pump inhibitor two times daily. Follow up with gastroenterologist for outpatient management of ulcers. We tested you for a bacteria that can cause ulcers called H. Pylori. Please follow up with Dr. Connolly as outpatient for those results as outpatient in one week. Please call for an appointment

## 2017-02-04 LAB
ANION GAP SERPL CALC-SCNC: 8 MMOL/L — LOW (ref 9–16)
BUN SERPL-MCNC: 10 MG/DL — SIGNIFICANT CHANGE UP (ref 7–23)
CALCIUM SERPL-MCNC: 8.1 MG/DL — LOW (ref 8.5–10.5)
CHLORIDE SERPL-SCNC: 110 MMOL/L — HIGH (ref 96–108)
CO2 SERPL-SCNC: 27 MMOL/L — SIGNIFICANT CHANGE UP (ref 22–31)
CREAT SERPL-MCNC: 1.13 MG/DL — SIGNIFICANT CHANGE UP (ref 0.5–1.3)
GLUCOSE SERPL-MCNC: 93 MG/DL — SIGNIFICANT CHANGE UP (ref 70–99)
HCT VFR BLD CALC: 27 % — LOW (ref 39–50)
HGB BLD-MCNC: 8.9 G/DL — LOW (ref 13–17)
MCHC RBC-ENTMCNC: 29.7 PG — SIGNIFICANT CHANGE UP (ref 27–34)
MCHC RBC-ENTMCNC: 33 G/DL — SIGNIFICANT CHANGE UP (ref 32–36)
MCV RBC AUTO: 90 FL — SIGNIFICANT CHANGE UP (ref 80–100)
PLATELET # BLD AUTO: 153 K/UL — SIGNIFICANT CHANGE UP (ref 150–400)
POTASSIUM SERPL-MCNC: 3.9 MMOL/L — SIGNIFICANT CHANGE UP (ref 3.5–5.3)
POTASSIUM SERPL-SCNC: 3.9 MMOL/L — SIGNIFICANT CHANGE UP (ref 3.5–5.3)
RBC # BLD: 3 M/UL — LOW (ref 4.2–5.8)
RBC # FLD: 12.7 % — SIGNIFICANT CHANGE UP (ref 10.3–16.9)
SODIUM SERPL-SCNC: 145 MMOL/L — SIGNIFICANT CHANGE UP (ref 135–145)
WBC # BLD: 4.2 K/UL — SIGNIFICANT CHANGE UP (ref 3.8–10.5)
WBC # FLD AUTO: 4.2 K/UL — SIGNIFICANT CHANGE UP (ref 3.8–10.5)

## 2017-02-04 PROCEDURE — 99231 SBSQ HOSP IP/OBS SF/LOW 25: CPT | Mod: GC

## 2017-02-04 PROCEDURE — 99232 SBSQ HOSP IP/OBS MODERATE 35: CPT

## 2017-02-04 RX ORDER — PANTOPRAZOLE SODIUM 20 MG/1
40 TABLET, DELAYED RELEASE ORAL
Qty: 0 | Refills: 0 | Status: DISCONTINUED | OUTPATIENT
Start: 2017-02-05 | End: 2017-02-05

## 2017-02-04 RX ORDER — PANTOPRAZOLE SODIUM 20 MG/1
8 TABLET, DELAYED RELEASE ORAL
Qty: 80 | Refills: 0 | Status: DISCONTINUED | OUTPATIENT
Start: 2017-02-04 | End: 2017-02-05

## 2017-02-04 RX ADMIN — PANTOPRAZOLE SODIUM 10 MG/HR: 20 TABLET, DELAYED RELEASE ORAL at 18:48

## 2017-02-04 RX ADMIN — PANTOPRAZOLE SODIUM 10 MG/HR: 20 TABLET, DELAYED RELEASE ORAL at 01:00

## 2017-02-04 RX ADMIN — PANTOPRAZOLE SODIUM 10 MG/HR: 20 TABLET, DELAYED RELEASE ORAL at 07:52

## 2017-02-04 NOTE — PROGRESS NOTE ADULT - PROBLEM SELECTOR PLAN 1
During endoscopy GI hemoclipped oozing ulcer (Ellis Classification 1B). This classification has a high risk of rebleed therefore pt monitored for signs or symptoms of blood loss: CBC Q8hr for H/H levels and BMP to evaluate BUN. 80 mg protonix drip at rate of 8mg/hr. Advance diet as tolerated. Follow up w GI for outpatient workup.

## 2017-02-04 NOTE — PROGRESS NOTE ADULT - PROBLEM SELECTOR PLAN 2
Pt discontinued home use of Crestor due to lapse in insurance coverage. Resume as indicated and follow up w PMD

## 2017-02-04 NOTE — PROVIDER CONTACT NOTE (OTHER) - BACKGROUND
Pt also with pain to left leg. Percocet given at 11am as ordered. Pt reports no pain relief after 1 hr. Dr Perez notified and does not want to increase pain meds at this time. Continue to monitor.

## 2017-02-04 NOTE — PROGRESS NOTE ADULT - ASSESSMENT
51 year old male with hx of GERD (treated with lifestyle modification) with recent binge drinking presents with melena x1 day and elevated BUN.  S/P EGD with multiple gastric and duodenal ulcers s/p hemoclip placement of oozing ulcer (forest class Ib - 50% chance of rebleed). Melena resolved, hgb stable.      1. UGIB 2/2 to multiple duodenal ulcers s/p hemoclip placement     -Monitor CBCs  -Cont IV PPI for total of 72 hours  -Can advance diet as tolerated   -Avoid NSAIDS      GI following

## 2017-02-04 NOTE — PROGRESS NOTE ADULT - ATTENDING COMMENTS
HP Ab status Pending.  no further bleeding, OK to adv diet today
Pt seen and examined at bedside, no complaints at this time- no abd pain or chest pain, no lightheadedness or dizziness, has not had any BM in last 24 hrs.  VSS.  Exam- RRR, CTAB, abd soft, normal BS, no LE edema.  Labs reviewed.  52 yo M with melena and acute blood loss anemia, GERD, and etoh binge.  Had bleeding ulcers s/p clip. Started on clears with protonix gtt x 72 hrs, CBC daily, active T+S present.  Has IVF with good IV access, d/c IVF at this point and start full liquid diet.  D/c home tomorrow AM with outpt GI f/u for biopsies and PPI PO daily.
Pt seen and examined at bedside, no complaints at this time- no abd pain or chest pain, no lightheadedness or dizziness, still having melanotic BMs today.  VS- hypotensive to 90s, orthostatic negative.  Exam- RRR, CTAB, abd soft, normal BS, no LE edema.  Labs reviewed.  50 yo M with melena and acute blood loss anemia, GERD, and etoh abuse.  Had bleeding ulcers s/p clip. Started on clears with protonix gtt x 72 hrs, CBC BID, active T+S present.  Has IVF with good IV access, will d/c once BPs higher and taking good PO.  F/u h pylori.  Will f/u closely.
Pt seen and examined at bedside, no complaints at this time- no abd pain or chest pain, no lightheadedness or dizziness, still having melanotic BMs today.  Exam- RRR, CTAB, abd soft, normal BS, no LE edema.  Labs reviewed.  52 yo M with melena and acute blood loss anemia, GERD, and etoh abuse.  For EGD today with GI.  NPO, c/w PPI gtt.  CBC BID, active T+S present.  Has IVF with good IV access.  Will f/u closely.

## 2017-02-04 NOTE — PROGRESS NOTE ADULT - ASSESSMENT
Mr. Smith is a 50 yo M w hx of HLD, GERD, and remote alcohol abuse who presented w melena and was found to have oozing ulcer (Kevin Classification 1B).

## 2017-02-04 NOTE — PROGRESS NOTE ADULT - SUBJECTIVE AND OBJECTIVE BOX
Pt seen and examined at bedside. No acute overnight events. Pt denies n/v abd pain. No further episodes of melena.  Hgb Stable         MEDICATIONS:  MEDICATIONS  (STANDING):  sodium chloride 0.9%. 1000milliLiter(s) IV Continuous <Continuous>  pantoprazole Infusion 8mG/Hr IV Continuous <Continuous>    MEDICATIONS  (PRN):      Allergies    penicillin (Unknown)    Intolerances        Vital Signs Last 24 Hrs  T(C): 36.8, Max: 37.1 (02-04 @ 06:30)  T(F): 98.2, Max: 98.7 (02-04 @ 06:30)  HR: 71 (63 - 80)  BP: 121/73 (100/62 - 144/77)  BP(mean): --  RR: 16 (16 - 18)  SpO2: 98% (93% - 99%)    I & Os for current day (as of 02-04 @ 10:58)  =============================================  IN: 1620 ml / OUT: 0 ml / NET: 1620 ml      PHYSICAL EXAM:    General: Well developed; well nourished; in no acute distress  HEENT: MMM, conjunctiva and sclera clear  Gastrointestinal: Soft non-tender non-distended; Normal bowel sounds      LABS:      CBC Full  -  ( 04 Feb 2017 06:11 )  WBC Count : 4.2 K/uL  Hemoglobin : 8.9 g/dL  Hematocrit : 27.0 %  Platelet Count - Automated : 153 K/uL  Mean Cell Volume : 90.0 fL  Mean Cell Hemoglobin : 29.7 pg  Mean Cell Hemoglobin Concentration : 33.0 g/dL      04 Feb 2017 06:10    145    |  110    |  10     ----------------------------<  93     3.9     |  27     |  1.13     Ca    8.1        04 Feb 2017 06:10                        RADIOLOGY & ADDITIONAL STUDIES (The following images were personally reviewed):

## 2017-02-04 NOTE — PROGRESS NOTE ADULT - SUBJECTIVE AND OBJECTIVE BOX
INTERVAL HPI/OVERNIGHT EVENTS:  No events overnight.    In the morning, patient reports feeling well.      VITAL SIGNS:  T(F): 98.7  HR: 66  BP: 106/63  RR: 16  SpO2: 98%  Wt(kg): --    PHYSICAL EXAM:    Constitutional: well appearing, well nourished  Eyes: PERRLA, EOM intact  ENMT: dry oral mucosa, no tonsillar erythema  Neck: supple, no jvd, no cervical lymphadenopathy  Respiratory: lungs CTAB, no ronchi, rales, or wheezes  Cardiovascular: RRR, normal S1S2, no murmurs, rubs or gallops  Gastrointestinal: + BS, abdomen soft, non tender, non distended, no rebound or guarding, no hepatomegaly or splenomegaly  Extremities: warm, well perfused  Vascular: palpable pulses b/l in radial and posterior tibial arteries   Neurological: AAO x3, no focal deficits  Musculoskeletal: full ROM, no joint deformity    MEDICATIONS  (STANDING):  sodium chloride 0.9%. 1000milliLiter(s) IV Continuous <Continuous>  pantoprazole Infusion 8mG/Hr IV Continuous <Continuous>    MEDICATIONS  (PRN):      Allergies    penicillin (Unknown)    Intolerances        LABS:                        8.9    4.2   )-----------( 153      ( 04 Feb 2017 06:11 )             27.0     04 Feb 2017 06:10    145    |  110    |  10     ----------------------------<  93     3.9     |  27     |  1.13     Ca    8.1        04 Feb 2017 06:10 INTERVAL HPI/OVERNIGHT EVENTS:  No events overnight.    This morning, patient reports feeling well.      VITAL SIGNS:  T(F): 98.7  HR: 66  BP: 106/63  RR: 16  SpO2: 98%  Wt(kg): --    PHYSICAL EXAM:    Constitutional: well appearing, well nourished  Eyes: PERRLA, EOM intact  ENMT: dry oral mucosa, no tonsillar erythema  Neck: supple, no jvd, no cervical lymphadenopathy  Respiratory: lungs CTAB, no ronchi, rales, or wheezes  Cardiovascular: RRR, normal S1S2, no murmurs, rubs or gallops  Gastrointestinal: + BS, abdomen soft, non tender, non distended, no rebound or guarding, no hepatomegaly or splenomegaly  Extremities: warm, well perfused  Vascular: b/l palpable pulses radial and posterior tibial arteries   Neurological: AAO x3, no focal deficits  Musculoskeletal: full ROM, no joint deformity    MEDICATIONS  (STANDING):  sodium chloride 0.9%. 1000milliLiter(s) IV Continuous <Continuous>  pantoprazole Infusion 8mG/Hr IV Continuous <Continuous>    MEDICATIONS  (PRN):      Allergies    penicillin (Unknown)    Intolerances        LABS:                        8.9    4.2   )-----------( 153      ( 04 Feb 2017 06:11 )             27.0     04 Feb 2017 06:10    145    |  110    |  10     ----------------------------<  93     3.9     |  27     |  1.13     Ca    8.1        04 Feb 2017 06:10

## 2017-02-05 VITALS
RESPIRATION RATE: 17 BRPM | SYSTOLIC BLOOD PRESSURE: 110 MMHG | OXYGEN SATURATION: 98 % | TEMPERATURE: 98 F | DIASTOLIC BLOOD PRESSURE: 65 MMHG | HEART RATE: 66 BPM

## 2017-02-05 LAB
HCT VFR BLD CALC: 27.1 % — LOW (ref 39–50)
HGB BLD-MCNC: 9.2 G/DL — LOW (ref 13–17)
MCHC RBC-ENTMCNC: 30.3 PG — SIGNIFICANT CHANGE UP (ref 27–34)
MCHC RBC-ENTMCNC: 33.9 G/DL — SIGNIFICANT CHANGE UP (ref 32–36)
MCV RBC AUTO: 89.1 FL — SIGNIFICANT CHANGE UP (ref 80–100)
PLATELET # BLD AUTO: 184 K/UL — SIGNIFICANT CHANGE UP (ref 150–400)
RBC # BLD: 3.04 M/UL — LOW (ref 4.2–5.8)
RBC # FLD: 13.5 % — SIGNIFICANT CHANGE UP (ref 10.3–16.9)
WBC # BLD: 4.2 K/UL — SIGNIFICANT CHANGE UP (ref 3.8–10.5)
WBC # FLD AUTO: 4.2 K/UL — SIGNIFICANT CHANGE UP (ref 3.8–10.5)

## 2017-02-05 PROCEDURE — 93005 ELECTROCARDIOGRAM TRACING: CPT

## 2017-02-05 PROCEDURE — 86901 BLOOD TYPING SEROLOGIC RH(D): CPT

## 2017-02-05 PROCEDURE — 86900 BLOOD TYPING SEROLOGIC ABO: CPT

## 2017-02-05 PROCEDURE — 80048 BASIC METABOLIC PNL TOTAL CA: CPT

## 2017-02-05 PROCEDURE — 80053 COMPREHEN METABOLIC PANEL: CPT

## 2017-02-05 PROCEDURE — 82607 VITAMIN B-12: CPT

## 2017-02-05 PROCEDURE — 99285 EMERGENCY DEPT VISIT HI MDM: CPT | Mod: 25

## 2017-02-05 PROCEDURE — 82746 ASSAY OF FOLIC ACID SERUM: CPT

## 2017-02-05 PROCEDURE — 99239 HOSP IP/OBS DSCHRG MGMT >30: CPT

## 2017-02-05 PROCEDURE — C1889: CPT

## 2017-02-05 PROCEDURE — 85027 COMPLETE CBC AUTOMATED: CPT

## 2017-02-05 PROCEDURE — 85730 THROMBOPLASTIN TIME PARTIAL: CPT

## 2017-02-05 PROCEDURE — 82728 ASSAY OF FERRITIN: CPT

## 2017-02-05 PROCEDURE — 85025 COMPLETE CBC W/AUTO DIFF WBC: CPT

## 2017-02-05 PROCEDURE — 83550 IRON BINDING TEST: CPT

## 2017-02-05 PROCEDURE — 85610 PROTHROMBIN TIME: CPT

## 2017-02-05 PROCEDURE — 86850 RBC ANTIBODY SCREEN: CPT

## 2017-02-05 PROCEDURE — 84443 ASSAY THYROID STIM HORMONE: CPT

## 2017-02-05 PROCEDURE — 83735 ASSAY OF MAGNESIUM: CPT

## 2017-02-05 PROCEDURE — 36415 COLL VENOUS BLD VENIPUNCTURE: CPT

## 2017-02-05 PROCEDURE — 96374 THER/PROPH/DIAG INJ IV PUSH: CPT

## 2017-02-05 PROCEDURE — 80061 LIPID PANEL: CPT

## 2017-02-05 RX ORDER — PANTOPRAZOLE SODIUM 20 MG/1
1 TABLET, DELAYED RELEASE ORAL
Qty: 60 | Refills: 0 | OUTPATIENT
Start: 2017-02-05 | End: 2017-03-07

## 2017-02-05 RX ORDER — PANTOPRAZOLE SODIUM 20 MG/1
1 TABLET, DELAYED RELEASE ORAL
Qty: 30 | Refills: 0 | OUTPATIENT
Start: 2017-02-05 | End: 2017-03-07

## 2017-02-05 RX ADMIN — PANTOPRAZOLE SODIUM 40 MILLIGRAM(S): 20 TABLET, DELAYED RELEASE ORAL at 06:18

## 2017-02-05 NOTE — PROGRESS NOTE ADULT - SUBJECTIVE AND OBJECTIVE BOX
Pt seen and examined at bedside this am. No acute overnight events. Pt afebrile and HD stable.  No melena or BRBPR. Hgb stable.  Tolerating reg diet.     MEDICATIONS:  MEDICATIONS  (STANDING):  pantoprazole    Tablet 40milliGRAM(s) Oral before breakfast  pantoprazole Infusion 8mG/Hr IV Continuous <Continuous>    MEDICATIONS  (PRN):      Allergies    penicillin (Unknown)    Intolerances        Vital Signs Last 24 Hrs  T(C): 36.6, Max: 36.8 (02-05 @ 05:28)  T(F): 97.9, Max: 98.3 (02-05 @ 05:28)  HR: 66 (66 - 85)  BP: 110/65 (100/63 - 125/62)  BP(mean): --  RR: 17 (17 - 18)  SpO2: 98% (91% - 98%)    I & Os for current day (as of 02-05 @ 11:32)  =============================================  IN: 815 ml / OUT: 0 ml / NET: 815 ml      PHYSICAL EXAM:    General: Well developed; well nourished; in no acute distress  HEENT: MMM, conjunctiva and sclera clear  Gastrointestinal: Soft non-tender non-distended; Normal bowel sounds      LABS:      CBC Full  -  ( 05 Feb 2017 07:36 )  WBC Count : 4.2 K/uL  Hemoglobin : 9.2 g/dL  Hematocrit : 27.1 %  Platelet Count - Automated : 184 K/uL  Mean Cell Volume : 89.1 fL  Mean Cell Hemoglobin : 30.3 pg  Mean Cell Hemoglobin Concentration : 33.9 g/dL  Auto Neutrophil # : x  Auto Lymphocyte # : x  Auto Monocyte # : x  Auto Eosinophil # : x  Auto Basophil # : x  Auto Neutrophil % : x  Auto Lymphocyte % : x  Auto Monocyte % : x  Auto Eosinophil % : x  Auto Basophil % : x    04 Feb 2017 06:10    145    |  110    |  10     ----------------------------<  93     3.9     |  27     |  1.13     Ca    8.1        04 Feb 2017 06:10                        RADIOLOGY & ADDITIONAL STUDIES (The following images were personally reviewed):

## 2017-02-05 NOTE — PROGRESS NOTE ADULT - ASSESSMENT
51 year old male with hx of GERD (treated with lifestyle modification) with recent binge drinking presents with melena x1 day and elevated BUN.  S/P EGD with multiple gastric and duodenal ulcers s/p hemoclip placement of oozing ulcer (forest class Ib - 50% chance of rebleed). Melena resolved, hgb stable.  St    1. UGIB 2/2 to multiple duodenal ulcers s/p hemoclip placement     -Hgb - remains stable   -Can D/C IV PPI and start on PPI BID PO   -H. Pylori stool serologies   -Pt to follow up with Dr. Connolly as outpatient on discharge.    Thank you for your consult, please reconsult as needed.

## 2017-02-05 NOTE — PROGRESS NOTE ADULT - PROVIDER SPECIALTY LIST ADULT
Gastroenterology
Internal Medicine

## 2017-02-06 ENCOUNTER — INPATIENT (INPATIENT)
Facility: HOSPITAL | Age: 52
LOS: 2 days | Discharge: HOME | DRG: 378 | End: 2017-02-09
Attending: INTERNAL MEDICINE | Admitting: INTERNAL MEDICINE
Payer: MEDICAID

## 2017-02-06 VITALS
OXYGEN SATURATION: 99 % | HEART RATE: 86 BPM | RESPIRATION RATE: 17 BRPM | WEIGHT: 222.01 LBS | TEMPERATURE: 99 F | DIASTOLIC BLOOD PRESSURE: 74 MMHG | SYSTOLIC BLOOD PRESSURE: 127 MMHG | HEIGHT: 74 IN

## 2017-02-06 DIAGNOSIS — R71.0 PRECIPITOUS DROP IN HEMATOCRIT: ICD-10-CM

## 2017-02-06 LAB
ALBUMIN SERPL ELPH-MCNC: 3.4 G/DL — SIGNIFICANT CHANGE UP (ref 3.4–5)
ALP SERPL-CCNC: 61 U/L — SIGNIFICANT CHANGE UP (ref 40–120)
ALT FLD-CCNC: 24 U/L — SIGNIFICANT CHANGE UP (ref 12–42)
ANION GAP SERPL CALC-SCNC: 10 MMOL/L — SIGNIFICANT CHANGE UP (ref 9–16)
APTT BLD: 27.5 SEC — SIGNIFICANT CHANGE UP (ref 27.5–37.4)
AST SERPL-CCNC: 12 U/L — LOW (ref 15–37)
BASOPHILS NFR BLD AUTO: 0.3 % — SIGNIFICANT CHANGE UP (ref 0–2)
BASOPHILS NFR BLD AUTO: 0.4 % — SIGNIFICANT CHANGE UP (ref 0–2)
BILIRUB SERPL-MCNC: 0.3 MG/DL — SIGNIFICANT CHANGE UP (ref 0.2–1.2)
BLD GP AB SCN SERPL QL: NEGATIVE — SIGNIFICANT CHANGE UP
BUN SERPL-MCNC: 24 MG/DL — HIGH (ref 7–23)
CALCIUM SERPL-MCNC: 8.5 MG/DL — SIGNIFICANT CHANGE UP (ref 8.5–10.5)
CHLORIDE SERPL-SCNC: 106 MMOL/L — SIGNIFICANT CHANGE UP (ref 96–108)
CO2 SERPL-SCNC: 26 MMOL/L — SIGNIFICANT CHANGE UP (ref 22–31)
CREAT SERPL-MCNC: 1.23 MG/DL — SIGNIFICANT CHANGE UP (ref 0.5–1.3)
EOSINOPHIL NFR BLD AUTO: 2.2 % — SIGNIFICANT CHANGE UP (ref 0–6)
EOSINOPHIL NFR BLD AUTO: 2.4 % — SIGNIFICANT CHANGE UP (ref 0–6)
GLUCOSE SERPL-MCNC: 100 MG/DL — HIGH (ref 70–99)
H.PYLORI ANTIBODY IGA: 30 UNITS — HIGH (ref 0–20)
HCT VFR BLD CALC: 20.7 % — CRITICAL LOW (ref 39–50)
HCT VFR BLD CALC: 23.6 % — LOW (ref 39–50)
HGB BLD-MCNC: 7.1 G/DL — LOW (ref 13–17)
HGB BLD-MCNC: 7.9 G/DL — LOW (ref 13–17)
INR BLD: 1.03 — SIGNIFICANT CHANGE UP (ref 0.88–1.16)
LYMPHOCYTES # BLD AUTO: 27.2 % — SIGNIFICANT CHANGE UP (ref 13–44)
LYMPHOCYTES # BLD AUTO: 32.3 % — SIGNIFICANT CHANGE UP (ref 13–44)
MCHC RBC-ENTMCNC: 29.8 PG — SIGNIFICANT CHANGE UP (ref 27–34)
MCHC RBC-ENTMCNC: 30.2 PG — SIGNIFICANT CHANGE UP (ref 27–34)
MCHC RBC-ENTMCNC: 33.5 G/DL — SIGNIFICANT CHANGE UP (ref 32–36)
MCHC RBC-ENTMCNC: 34.3 G/DL — SIGNIFICANT CHANGE UP (ref 32–36)
MCV RBC AUTO: 88.1 FL — SIGNIFICANT CHANGE UP (ref 80–100)
MCV RBC AUTO: 89.1 FL — SIGNIFICANT CHANGE UP (ref 80–100)
MONOCYTES NFR BLD AUTO: 6.3 % — SIGNIFICANT CHANGE UP (ref 2–14)
MONOCYTES NFR BLD AUTO: 6.4 % — SIGNIFICANT CHANGE UP (ref 2–14)
NEUTROPHILS NFR BLD AUTO: 58.6 % — SIGNIFICANT CHANGE UP (ref 43–77)
NEUTROPHILS NFR BLD AUTO: 63.9 % — SIGNIFICANT CHANGE UP (ref 43–77)
PLATELET # BLD AUTO: 199 K/UL — SIGNIFICANT CHANGE UP (ref 150–400)
PLATELET # BLD AUTO: 239 K/UL — SIGNIFICANT CHANGE UP (ref 150–400)
POTASSIUM SERPL-MCNC: 3.8 MMOL/L — SIGNIFICANT CHANGE UP (ref 3.5–5.3)
POTASSIUM SERPL-SCNC: 3.8 MMOL/L — SIGNIFICANT CHANGE UP (ref 3.5–5.3)
PROT SERPL-MCNC: 6.4 G/DL — SIGNIFICANT CHANGE UP (ref 6.4–8.2)
PROTHROM AB SERPL-ACNC: 11.4 SEC — SIGNIFICANT CHANGE UP (ref 10–13.1)
RBC # BLD: 2.35 M/UL — LOW (ref 4.2–5.8)
RBC # BLD: 2.65 M/UL — LOW (ref 4.2–5.8)
RBC # FLD: 14.2 % — SIGNIFICANT CHANGE UP (ref 10.3–16.9)
RBC # FLD: 14.2 % — SIGNIFICANT CHANGE UP (ref 10.3–16.9)
RH IG SCN BLD-IMP: POSITIVE — SIGNIFICANT CHANGE UP
SODIUM SERPL-SCNC: 142 MMOL/L — SIGNIFICANT CHANGE UP (ref 135–145)
WBC # BLD: 5.1 K/UL — SIGNIFICANT CHANGE UP (ref 3.8–10.5)
WBC # BLD: 5.9 K/UL — SIGNIFICANT CHANGE UP (ref 3.8–10.5)
WBC # FLD AUTO: 5.1 K/UL — SIGNIFICANT CHANGE UP (ref 3.8–10.5)
WBC # FLD AUTO: 5.9 K/UL — SIGNIFICANT CHANGE UP (ref 3.8–10.5)

## 2017-02-06 PROCEDURE — 99291 CRITICAL CARE FIRST HOUR: CPT | Mod: 25

## 2017-02-06 PROCEDURE — 93010 ELECTROCARDIOGRAM REPORT: CPT

## 2017-02-06 RX ORDER — PANTOPRAZOLE SODIUM 20 MG/1
8 TABLET, DELAYED RELEASE ORAL
Qty: 80 | Refills: 0 | Status: DISCONTINUED | OUTPATIENT
Start: 2017-02-06 | End: 2017-02-07

## 2017-02-06 RX ORDER — TADALAFIL 10 MG/1
1 TABLET, FILM COATED ORAL
Qty: 0 | Refills: 0 | COMMUNITY

## 2017-02-06 RX ORDER — SODIUM CHLORIDE 9 MG/ML
1000 INJECTION INTRAMUSCULAR; INTRAVENOUS; SUBCUTANEOUS
Qty: 0 | Refills: 0 | Status: DISCONTINUED | OUTPATIENT
Start: 2017-02-06 | End: 2017-02-09

## 2017-02-06 RX ORDER — TAMSULOSIN HYDROCHLORIDE 0.4 MG/1
1 CAPSULE ORAL
Qty: 0 | Refills: 0 | COMMUNITY

## 2017-02-06 RX ADMIN — SODIUM CHLORIDE 125 MILLILITER(S): 9 INJECTION INTRAMUSCULAR; INTRAVENOUS; SUBCUTANEOUS at 20:59

## 2017-02-06 RX ADMIN — PANTOPRAZOLE SODIUM 10 MG/HR: 20 TABLET, DELAYED RELEASE ORAL at 22:23

## 2017-02-06 NOTE — ED PROVIDER NOTE - CRITICAL CARE PROVIDED
direct patient care (not related to procedure)/documentation/additional history taking/interpretation of diagnostic studies/consultation with other physicians

## 2017-02-06 NOTE — ED ADULT TRIAGE NOTE - CHIEF COMPLAINT QUOTE
c/o x1 episode of sari red stool that occurred this am. Recently discharged 2/3/17 for melena. Currently pale, +dizziness and weakness. c/o x1 episode of sari red stool that occurred this am. Recently discharged 2/3/17 for melena. Currently pale, intermittent dizziness and weakness.

## 2017-02-06 NOTE — ED ADULT NURSE NOTE - CHIEF COMPLAINT QUOTE
c/o x1 episode of sari red stool that occurred this am. Recently discharged 2/3/17 for melena. Currently pale, intermittent dizziness and weakness.

## 2017-02-06 NOTE — ED PROVIDER NOTE - GASTROINTESTINAL, MLM
Abd soft, NT, ND, NABS. No guarding, rebound, or rigidity. No pulsatile abdominal masses. No organomegaly appreciated. Rectal: no blood externally. no hemorrhoids or masses appreciated. liquid brown stool mixed with blood in the vault, guaiac +. no active bleeding from rectum.

## 2017-02-06 NOTE — ED PROVIDER NOTE - OBJECTIVE STATEMENT
Pt with PMHx GERD, HLD, s/p recent admission here at [facility] 2/2-2/5 for melena. Pt had EGD with multiple gastric and duodenal ulcers s/p hemoclip placement of oozing ulcer on 2/3 (Dr Connolly). Pt was monitored with serial H/H (12.8 on admission, ramirez 8.9, discharge 9.2). Pt did not receive blood tranfusion during admission. Pt has been taking Protonix 40 mg BID, last dose this evening. Pt's first BM since the 2/2 was around 1:30 am this morning, melena diarrhea. 2nd BM this afternoon / early evening was BRBPR, diarrhea. Pt reports feeling weak / lightheaded during the BM, but not otherwise. No SOB, palpitations. No abdominal pain. No bleeding in between BMs. No other complaints.

## 2017-02-06 NOTE — ED PROVIDER NOTE - CARE PLAN
Principal Discharge DX:	Gastrointestinal hemorrhage, unspecified gastrointestinal hemorrhage type  Secondary Diagnosis:	PUD (peptic ulcer disease)

## 2017-02-06 NOTE — ED PROVIDER NOTE - PROGRESS NOTE DETAILS
Spoke with GI fellow - keep NPO. they will scope in the morning. Transfuse if Hb drops < 7. PPI drip. Pt with dec in H/H w/o fluid resuscitation. Will transfuse 1 unit. Given down-trending Hb, pt's known hx multiple ulcers, will get MICU consult for possible [facility] admission MICU consult called and will see the pt Pt seen by MICU team - admit to [facility] under Dr Daniel. Requesting blood be infused over 30 minutes. Repeat CBC after

## 2017-02-06 NOTE — ED ADULT NURSE NOTE - OBJECTIVE STATEMENT
Pt just recently d/c after being treated for an ulcer, had diarrhea late last night, which was black and had diarrhea again this afternoon that contained sari blood. Pt state last night during his episode of diarrhea he became diaphoretic and felt like he was going to syncopize. Pt states his abdomen also become distended just prior to his first episode of diarrhea. Pt denies pain or any other symptoms. Pt abdomen is tight, but not ridged.

## 2017-02-06 NOTE — ED PROVIDER NOTE - MEDICAL DECISION MAKING DETAILS
Pt p/w BRBPR, recent admission for melena, known multiple ulcers, recent bleeding ulcer s/p hemoclip. Continue PPI, check serial Hcts, d/w GI, anticipate admission

## 2017-02-07 DIAGNOSIS — K92.2 GASTROINTESTINAL HEMORRHAGE, UNSPECIFIED: ICD-10-CM

## 2017-02-07 DIAGNOSIS — Z41.8 ENCOUNTER FOR OTHER PROCEDURES FOR PURPOSES OTHER THAN REMEDYING HEALTH STATE: ICD-10-CM

## 2017-02-07 DIAGNOSIS — R63.8 OTHER SYMPTOMS AND SIGNS CONCERNING FOOD AND FLUID INTAKE: ICD-10-CM

## 2017-02-07 DIAGNOSIS — E78.5 HYPERLIPIDEMIA, UNSPECIFIED: ICD-10-CM

## 2017-02-07 LAB
ANION GAP SERPL CALC-SCNC: 8 MMOL/L — LOW (ref 9–16)
APTT BLD: 28.1 SEC — SIGNIFICANT CHANGE UP (ref 27.5–37.4)
BLD GP AB SCN SERPL QL: NEGATIVE — SIGNIFICANT CHANGE UP
BUN SERPL-MCNC: 19 MG/DL — SIGNIFICANT CHANGE UP (ref 7–23)
CALCIUM SERPL-MCNC: 7.8 MG/DL — LOW (ref 8.5–10.5)
CHLORIDE SERPL-SCNC: 109 MMOL/L — HIGH (ref 96–108)
CO2 SERPL-SCNC: 26 MMOL/L — SIGNIFICANT CHANGE UP (ref 22–31)
CREAT SERPL-MCNC: 1.08 MG/DL — SIGNIFICANT CHANGE UP (ref 0.5–1.3)
GLUCOSE SERPL-MCNC: 90 MG/DL — SIGNIFICANT CHANGE UP (ref 70–99)
HCT VFR BLD CALC: 23.5 % — LOW (ref 39–50)
HCT VFR BLD CALC: 25 % — LOW (ref 39–50)
HCT VFR BLD CALC: 25.6 % — LOW (ref 39–50)
HGB BLD-MCNC: 7.9 G/DL — LOW (ref 13–17)
HGB BLD-MCNC: 8.2 G/DL — LOW (ref 13–17)
HGB BLD-MCNC: 8.4 G/DL — LOW (ref 13–17)
INR BLD: 1.06 — SIGNIFICANT CHANGE UP (ref 0.88–1.16)
MAGNESIUM SERPL-MCNC: 2 MG/DL — SIGNIFICANT CHANGE UP (ref 1.6–2.4)
MCHC RBC-ENTMCNC: 28.9 PG — SIGNIFICANT CHANGE UP (ref 27–34)
MCHC RBC-ENTMCNC: 29.1 PG — SIGNIFICANT CHANGE UP (ref 27–34)
MCHC RBC-ENTMCNC: 29.8 PG — SIGNIFICANT CHANGE UP (ref 27–34)
MCHC RBC-ENTMCNC: 32.8 G/DL — SIGNIFICANT CHANGE UP (ref 32–36)
MCHC RBC-ENTMCNC: 32.8 G/DL — SIGNIFICANT CHANGE UP (ref 32–36)
MCHC RBC-ENTMCNC: 33.6 G/DL — SIGNIFICANT CHANGE UP (ref 32–36)
MCV RBC AUTO: 88 FL — SIGNIFICANT CHANGE UP (ref 80–100)
MCV RBC AUTO: 88.6 FL — SIGNIFICANT CHANGE UP (ref 80–100)
MCV RBC AUTO: 88.7 FL — SIGNIFICANT CHANGE UP (ref 80–100)
PLATELET # BLD AUTO: 184 K/UL — SIGNIFICANT CHANGE UP (ref 150–400)
PLATELET # BLD AUTO: 196 K/UL — SIGNIFICANT CHANGE UP (ref 150–400)
PLATELET # BLD AUTO: 199 K/UL — SIGNIFICANT CHANGE UP (ref 150–400)
POTASSIUM SERPL-MCNC: 3.8 MMOL/L — SIGNIFICANT CHANGE UP (ref 3.5–5.3)
POTASSIUM SERPL-SCNC: 3.8 MMOL/L — SIGNIFICANT CHANGE UP (ref 3.5–5.3)
PROTHROM AB SERPL-ACNC: 11.8 SEC — SIGNIFICANT CHANGE UP (ref 10–13.1)
RBC # BLD: 2.65 M/UL — LOW (ref 4.2–5.8)
RBC # BLD: 2.84 M/UL — LOW (ref 4.2–5.8)
RBC # BLD: 2.89 M/UL — LOW (ref 4.2–5.8)
RBC # FLD: 14.5 % — SIGNIFICANT CHANGE UP (ref 10.3–16.9)
RBC # FLD: 15.2 % — SIGNIFICANT CHANGE UP (ref 10.3–16.9)
RBC # FLD: 15.7 % — SIGNIFICANT CHANGE UP (ref 10.3–16.9)
RH IG SCN BLD-IMP: POSITIVE — SIGNIFICANT CHANGE UP
SODIUM SERPL-SCNC: 143 MMOL/L — SIGNIFICANT CHANGE UP (ref 135–145)
WBC # BLD: 4.2 K/UL — SIGNIFICANT CHANGE UP (ref 3.8–10.5)
WBC # BLD: 4.6 K/UL — SIGNIFICANT CHANGE UP (ref 3.8–10.5)
WBC # BLD: 5 K/UL — SIGNIFICANT CHANGE UP (ref 3.8–10.5)
WBC # FLD AUTO: 4.2 K/UL — SIGNIFICANT CHANGE UP (ref 3.8–10.5)
WBC # FLD AUTO: 4.6 K/UL — SIGNIFICANT CHANGE UP (ref 3.8–10.5)
WBC # FLD AUTO: 5 K/UL — SIGNIFICANT CHANGE UP (ref 3.8–10.5)

## 2017-02-07 PROCEDURE — 99233 SBSQ HOSP IP/OBS HIGH 50: CPT | Mod: GC

## 2017-02-07 PROCEDURE — 99222 1ST HOSP IP/OBS MODERATE 55: CPT | Mod: GC

## 2017-02-07 PROCEDURE — 93010 ELECTROCARDIOGRAM REPORT: CPT

## 2017-02-07 RX ORDER — PANTOPRAZOLE SODIUM 20 MG/1
40 TABLET, DELAYED RELEASE ORAL
Qty: 0 | Refills: 0 | Status: DISCONTINUED | OUTPATIENT
Start: 2017-02-07 | End: 2017-02-08

## 2017-02-07 RX ORDER — METRONIDAZOLE 500 MG
500 TABLET ORAL
Qty: 0 | Refills: 0 | Status: DISCONTINUED | OUTPATIENT
Start: 2017-02-07 | End: 2017-02-07

## 2017-02-07 RX ORDER — CLARITHROMYCIN 500 MG
500 TABLET ORAL
Qty: 0 | Refills: 0 | Status: DISCONTINUED | OUTPATIENT
Start: 2017-02-07 | End: 2017-02-07

## 2017-02-07 RX ADMIN — SODIUM CHLORIDE 125 MILLILITER(S): 9 INJECTION INTRAMUSCULAR; INTRAVENOUS; SUBCUTANEOUS at 07:13

## 2017-02-07 RX ADMIN — PANTOPRAZOLE SODIUM 40 MILLIGRAM(S): 20 TABLET, DELAYED RELEASE ORAL at 18:47

## 2017-02-07 RX ADMIN — PANTOPRAZOLE SODIUM 10 MG/HR: 20 TABLET, DELAYED RELEASE ORAL at 07:13

## 2017-02-07 NOTE — PATIENT PROFILE ADULT. - TEACHING/LEARNING LEARNING PREFERENCES
verbal instruction/skill demonstration/audio/group instruction/written material/individual instruction

## 2017-02-07 NOTE — PROGRESS NOTE ADULT - SUBJECTIVE AND OBJECTIVE BOX
Hospital Course:  50 y/o male with a PMHx of HLD, recently discharged from [facility] after UGIB s/p clipping on 02/02, presents with complaints of bright red watery stools and diaphoresis. Patient was admitted 2/2-2/6 for UGIB, EGD previously showing an "oozing ulcer" s/p clipping. H/H was stable during hospital admission, tolerating PO, and was discharged with outpatient GI follow. Since discharge, patient woke up in the middle of the night diaphoretic, dizzy, and had an episode of large watery, dark diarrhea. Patient returned to bed and felt better. The following day at work, patient had similar symptoms, but now with formed stools with bright red blood. In the ED, VS stable, orthostatics negative. Hg initially 7.9 upon arrival, dropped to 7.1. Rectal Exam done by ED attending showing brown stools with gross blood. Patient started on IVF, Protonix gtt, and given 1u PRBC. Admitted to [facility].  Had EGD on 2/7 which did not show any bleed and prior clip still in place.  He was H. pylori antibodies + w/ plans to prophylactically treat.     Overnight Events: Admitted O/N.     Subjective: Patient seen and examined at bedside. Pt denies further BMs, hematemesis, nausea, vomiting, HA, dizziness, CP/SOB, abdominal pain and dysuria.     [OBJECTIVE]:    Vital Signs:  T(F): 97, Max: 98.8 (02-06 @ 18:06)  HR: 62 (52 - 86)  BP: 107/59 (97/50 - 127/74)  BP(mean): 78 (75 - 78)  RR: 16 (16 - 18)  SpO2: 97% (97% - 100%)  Wt(kg): --  CVP(cm H2O): --    CAPILLARY BLOOD GLUCOSE    Physical Exam:    General: NAD, Comfortable, Pleasant  HEENT: no scleral icterus, no ptosis, MMM, no JVD, no thyromegaly  Respiratory: CTA b/l, no wheezes, rales or rhonchi  Cardiovascular: Regular, +S1 + S2  Abdomen: Soft, NTND, normoactive bowel sounds, no rebound, no guarding no suprapubic tenderness  Extremities: No cyanosis, no clubbing, no edema, pulses equal, no calf tenderness  Skin: No rashes  Neurological: AO x 3, follows commands, moves all extremities    Medications:  MEDICATIONS  (STANDING):  sodium chloride 0.9%. 1000milliLiter(s) IV Continuous <Continuous>  pantoprazole  Injectable 40milliGRAM(s) IV Push two times a day    MEDICATIONS  (PRN):      Allergies    penicillin (Unknown)    Intolerances        Labs:                        8.2    4.6   )-----------( 184      ( 07 Feb 2017 07:18 )             25.0     07 Feb 2017 07:18    143    |  109    |  19     ----------------------------<  90     3.8     |  26     |  1.08     Ca    7.8        07 Feb 2017 07:18  Mg     2.0       07 Feb 2017 07:18    TPro  6.4    /  Alb  3.4    /  TBili  0.3    /  DBili  x      /  AST  12     /  ALT  24     /  AlkPhos  61     06 Feb 2017 20:49    PT/INR - ( 07 Feb 2017 07:18 )   PT: 11.8 sec;   INR: 1.06          PTT - ( 07 Feb 2017 07:18 )  PTT:28.1 sec      Radiology and other tests:    T(F): 97, Max: 98.8 (02-06 @ 18:06)  HR: 62 (52 - 86)  BP: 107/59 (97/50 - 127/74)  BP(mean): 78 (75 - 78)  RR: 16 (16 - 18)  SpO2: 97% (97% - 100%)  Wt(kg): --  CVP(cm H2O): --

## 2017-02-07 NOTE — H&P ADULT. - ASSESSMENT
52 y/o male, recently discharged from [facility] after having UGIB s/p clipping, returns to ED with similar complaints of bright red blood in stool with a decreasing Hg. Patient will be admitted for management of UGIB. 50 y/o male, recently discharged from [facility] after having UGIB 2/2 multiple duodenal/gastric ulcer(forest class IB) s/p clipping, returns to ED with similar complaints of bright red blood in stool with a decreasing Hg. Patient will be admitted for management of UGIB.

## 2017-02-07 NOTE — H&P ADULT. - RS GEN PE MLT RESP DETAILS PC
breath sounds equal/respirations non-labored/normal/airway patent/clear to auscultation bilaterally/good air movement

## 2017-02-07 NOTE — H&P ADULT. - PROBLEM SELECTOR PLAN 1
Patient with 2 bloody bowel movement in 2 days, decrease in Hgb 7.1 and increase in BUN concerning for rebleed of previous clipped vessel. Orthostatics negative in ED. ROS + dizziness, +LAWSON. No abdominal pain. Rectal + gross blood with brown stool. No blood BM's since been in ED. s/p IVF with 1uPRBC. GI consulted.   - c/w Protonix gtt  - f/u GI rec's. Will scope in AM   - f/u post transfusion CBC, and continue to get CBC q4-6h. Will transfuse if Hg < 7 - Maintain active T&S  - 2 large bore IV  - NPO   Dispo: Orthostatics negative, VS stable, no evidence of HD instability at this time.  However given high risk for rapid decompensation in the setting of acute blood loss patient to be monitoried on telemetry.  Admit to [facility] Patient with 2 bloody bowel movement in 2 days, decrease in Hgb 7.1 and increase in BUN concerning for rebleed of previous clipped vessel. Orthostatics negative in ED. ROS + dizziness, +LAWSON, +Abdominal discomfort. Rectal + gross blood with brown stool. No blood BM's since 02/06 2pm. s/p IVF with 1uPRBC. GI consulted.   - c/w Protonix gtt  - f/u GI rec's. Will scope in AM   - f/u post transfusion CBC, and continue to get CBC q4-6h. Will transfuse if Hg < 7 - Maintain active T&S  - 2 large bore IV  - NPO   Dispo: Orthostatics negative, VS stable, no evidence of HD instability at this time.  However given high risk for rapid decompensation in the setting of acute blood loss patient to be monitoried on telemetry.  Admit to [facility] Patient with 2 bloody bowel movement in 2 days, decrease in Hgb 7.1 and increase in BUN concerning for rebleed of previous clipped vessel. Orthostatics negative in ED. ROS + dizziness, +LAWSON, +Abdominal discomfort. Rectal + gross blood with brown stool. No blood BM's since 02/06 2pm. s/p IVF with 1uPRBC. GI consulted.   - c/w Protonix gtt  - f/u GI rec's. Will scope in AM   - f/u post transfusion CBC, and continue to get CBC q4-6h. Will transfuse if Hg < 7 - Maintain active T&S  - 2 large bore IV  - NPO   Dispo: Orthostatics negative, VS stable, no evidence of HD instability at this time.  However given high risk for rapid decompensation in the setting of acute blood loss patient to be monitored on telemetry.  Admit to [facility]

## 2017-02-07 NOTE — PROGRESS NOTE ADULT - ASSESSMENT
52 y/o male, recently discharged from [facility] after having UGIB 2/2 multiple duodenal/gastric ulcer(forest class IB) s/p clipping, returns to ED with similar complaints of bright red blood in stool with a decreasing Hg that eventually stabilized.  EGD unremarkable.

## 2017-02-07 NOTE — H&P ADULT. - HISTORY OF PRESENT ILLNESS
50 y/o male with a pmhx of HLD, recently discharged from [facility] after UGIB s/p clipping on 02/02, presents with complaints of bright red watery stools and diaphoresis. Patient was admitted on 02/02 for UGIB, EGD showing an "oozing ulcer" s/p clipping. H/H was stable during hospital admission, tolerating PO, and was discharged with outpatient GI follow. Since discharge, patient was doing well, back to daily routine, when he woke up in the middle of the night diaphoretic, dizzy, and had an episode of large watery, dark diarrhea. Patient returned to bed and felt better. The following day at work, patient had similar symptoms, but now with bright 50 y/o male with a pmhx of HLD, recently discharged from [facility] after UGIB s/p clipping on 02/02, presents with complaints of bright red watery stools and diaphoresis. Patient was admitted on 02/02 for UGIB, EGD showing an "oozing ulcer" s/p clipping. H/H was stable during hospital admission, tolerating PO, and was discharged with outpatient GI follow. Since discharge, patient was doing well, back to daily routine, when he woke up in the middle of the night diaphoretic, dizzy, and had an episode of large watery, dark diarrhea. Patient returned to bed and felt better. The following day at work, patient had similar symptoms, but now with formed stools with bright red blood. Symptoms are associated with SOB, pale skin, and dizziness. This prompted the patient to come into the ED. Patient denies any recent NSAID use. Patient denies any fevers, chest pain, abdominal pain, nausea or vomiting.     In the ED, VS stable, orthostatics negative. Hg initially 7.9 upon arrival, dropping to 7.1. Rectal Exam done by ED attending showing brown stools with gross blood. Patient started on IVF, Protonix gtt, and given 1u PRBC. GI called and notified of situation. Will admit patient to [facility] for further management of UGIB. 52 y/o male with a pmhx of HLD, recently discharged from [facility] after UGIB s/p clipping on 02/02, presents with complaints of bright red watery stools and diaphoresis. Patient was admitted on 02/02 for UGIB, EGD showing an "oozing ulcer" s/p clipping. H/H was stable during hospital admission, tolerating PO, and was discharged with outpatient GI follow. Since discharge, patient was doing well, back to daily routine, ate brunch in pm, slept, when he woke up in the middle of the night diaphoretic, dizzy, and had an episode of large watery, dark diarrhea. Patient returned to bed and felt better. The following day at work, patient had similar symptoms, but now with formed stools with bright red blood. Symptoms are associated with SOB, pale skin, and dizziness. This prompted the patient to come into the ED. Patient denies any recent NSAID use. Patient denies any fevers, chest pain, abdominal pain, nausea or vomiting. Social drinker, denies smoking and IVDA.    In the ED, VS stable, orthostatics negative. Hg initially 7.9 upon arrival, dropping to 7.1. Rectal Exam done by ED attending showing brown stools with gross blood. Patient started on IVF, Protonix gtt, and given 1u PRBC. GI called and notified of situation. Will admit patient to [facility] for further management of UGIB.

## 2017-02-07 NOTE — CONSULT NOTE ADULT - ATTENDING COMMENTS
51M in good conditioning with recurrent GIB, with 2 gm/dL drop in Hb associated with symtoms of lightheadedness and diaphoresis at home, with melena and BRB. GI to endoscope in am. given marked drop in Hb will admit to medical stepdown/tele, transfuse 1 prbc and goal Hb 8, monitor cbc q4, PPI IV

## 2017-02-07 NOTE — CONSULT NOTE ADULT - PROBLEM SELECTOR RECOMMENDATION 9
- Pt with 2 bloody BM, decrease in Hgb and increase in BUN concerning for rebleed of previous clipped vessel.  Orthostatics done in ED negative, pt without additional bloody BMs.  - Currently pt on PPI drip, receiving 1u pRBC, GI consulted will rescope patient ana  - C/w PPI drip, f/u post transfusion CBC, active T&S, 2 large bore IV.  C/w NPO  Dispo: Orthostatics negative, VS stable, no evidence of HD instability. - Pt with 2 bloody BM, decrease in Hgb and increase in BUN concerning for rebleed of previous clipped vessel.  Orthostatics done in ED negative, pt without additional bloody BMs.  - Currently pt on PPI drip, receiving 1u pRBC, GI consulted will rescope patient ana  - C/w PPI drip, f/u post transfusion CBC, active T&S, 2 large bore IV.  C/w NPO, q4-6 CBC  Dispo: Orthostatics negative, VS stable, no evidence of HD instability at this time.  However given high risk for rapid decompensation in the setting of acute blood loss patient to be monitoried on telemetry.  Admit to [facility]

## 2017-02-07 NOTE — CONSULT NOTE ADULT - SUBJECTIVE AND OBJECTIVE BOX
HPI:    52 y/o male well known to GI with pmhx recently discharged from [facility] after UGIB s/p clipping on 02/02, presents with repeat episodes of melena.  Patient was admitted on 02/02 for UGIB, EGD showing an "oozing ulcer" s/p clipping. H/H was stable during hospital admission, tolerating PO, and was discharged with outpatient GI follow. Since discharge, patient was doing well, back to daily routine then he woke up in the middle of the night diaphoretic, dizzy, and had an episode of large watery, dark diarrhea. Patient returned to bed and felt better. The following day at work, patient had similar symptoms, with repeat episode of "black tarry" stools. Symptoms were associated with SOB, pale skin, and dizziness. This prompted the patient to come into the ED. Patient denies any recent NSAID use. Patient denies any fevers, chest pain, abdominal pain, nausea or vomiting. Social drinker, denies smoking and IVDA.        PAST MEDICAL & SURGICAL HISTORY:  GERD (gastroesophageal reflux disease)  Hyperlipidemia  No significant past surgical history      MEDICATIONS  (STANDING):  sodium chloride 0.9%. 1000milliLiter(s) IV Continuous <Continuous>  pantoprazole Infusion 8mG/Hr IV Continuous <Continuous>  clarithromycin 500milliGRAM(s) Oral two times a day  clarithromycin 500milliGRAM(s) Oral two times a day  metroNIDAZOLE    Tablet 500milliGRAM(s) Oral two times a day    MEDICATIONS  (PRN):      Allergies    penicillin (Unknown)    Intolerances        SOCIAL HISTORY: social EtOH     FAMILY HISTORY:  Family history of Hodgkins disease (Mother)      Vital Signs Last 24 Hrs  T(C): 36.1, Max: 37.1 (02-06 @ 18:06)  T(F): 97, Max: 98.8 (02-06 @ 18:06)  HR: 62 (52 - 86)  BP: 107/59 (97/50 - 127/74)  BP(mean): 78 (75 - 78)  RR: 16 (16 - 18)  SpO2: 97% (97% - 100%)    PHYSICAL EXAM:    GEN: well appearing, NAD, AOx3  HEENT: anicteric  CHEST: no w/r/r  CVS: no m/r/g  ABD: soft, nt, nd, bs+  RECTAL: no stool in vault          LABS:                        8.2    4.6   )-----------( 184      ( 07 Feb 2017 07:18 )             25.0     07 Feb 2017 07:18    143    |  109    |  19     ----------------------------<  90     3.8     |  26     |  1.08     Ca    7.8        07 Feb 2017 07:18  Mg     2.0       07 Feb 2017 07:18    TPro  6.4    /  Alb  3.4    /  TBili  0.3    /  DBili  x      /  AST  12     /  ALT  24     /  AlkPhos  61     06 Feb 2017 20:49    PT/INR - ( 07 Feb 2017 07:18 )   PT: 11.8 sec;   INR: 1.06          PTT - ( 07 Feb 2017 07:18 )  PTT:28.1 sec      RADIOLOGY & ADDITIONAL STUDIES:

## 2017-02-07 NOTE — CONSULT NOTE ADULT - SUBJECTIVE AND OBJECTIVE BOX
ICU CONSULT    Patient is a 51y old  Male who presents with a chief complaint of GI bleed    52 yo male with pmhx of HLD who presents one day after being d/c from [facility] where he was admitted for GI bleed, now with dark stools again.  Pt admitted and found to have oozing ulcer which was clipped, and post EGD pt H&H stable, he tolerated PO and was d/c with outpatient GI follow up.  Since d/c pt tolerated PO well, and then woke up in the middle of the night last night with a large black watery BM, associated with diaphoresis and dizziness to the point that he felt as if he was going to pass out.    PMHx -   PSx -   Meds -   Allergies -   FHx -   Sx -       PHYSICAL EXAM   Vital Signs Last 24 Hrs  T(C): 36.9, Max: 37.1 (02-06 @ 18:06)  T(F): 98.4, Max: 98.8 (02-06 @ 18:06)  HR: 67 (67 - 86)  BP: 126/63 (118/76 - 127/74)  BP(mean): --  RR: 18 (17 - 18)  SpO2: 100% (99% - 100%)      General -   HEENT -   CV -   Resp -   Abdomen -   Extremities -   Skin -       LABS                        7.1    5.1   )-----------( 199      ( 06 Feb 2017 22:32 )             20.7     06 Feb 2017 20:49    142    |  106    |  24     ----------------------------<  100    3.8     |  26     |  1.23     Ca    8.5        06 Feb 2017 20:49    TPro  6.4    /  Alb  3.4    /  TBili  0.3    /  DBili  x      /  AST  12     /  ALT  24     /  AlkPhos  61     06 Feb 2017 20:49    PT/INR - ( 06 Feb 2017 20:49 )   PT: 11.4 sec;   INR: 1.03          PTT - ( 06 Feb 2017 20:49 )  PTT:27.5 sec            IMAGING   CXR -   EKG - ICU CONSULT    Patient is a 51y old  Male who presents with a chief complaint of GI bleed    52 yo male with pmhx of HLD who presents one day after being d/c from [facility] where he was admitted for GI bleed, now with dark stools again.  Pt admitted and found to have oozing ulcer which was clipped, and post EGD pt H&H stable, he tolerated PO and was d/c with outpatient GI follow up.  Since d/c pt tolerated PO well, and then woke up in the middle of the night last night with a large black watery BM, associated with diaphoresis and dizziness to the point that he felt as if he was going to pass out. Subsequently today pt had another BM that was bright red but more formed stool. These BM are associated with LAWSON, but pt denies any CP, n/v, fevers or chills.  ROS otherwise negative.  Pt denies any NSAID or ASA use.    PMHx - HLD  PSx - Achilles tendon surgery  Meds - Protonix, Crestor  Allergies - Penicillin (rash)  FHx - Hodgkins disease (Mother)  Sx - Former alcohol use quit in 2002      PHYSICAL EXAM   Vital Signs Last 24 Hrs  T(C): 36.9, Max: 37.1 (02-06 @ 18:06)  T(F): 98.4, Max: 98.8 (02-06 @ 18:06)  HR: 67 (67 - 86)  BP: 126/63 (118/76 - 127/74)  BP(mean): --  RR: 18 (17 - 18)  SpO2: 100% (99% - 100%)      General - NAD, appears well  HEENT - Moist mucus membranes, mild conjunctival pallor  CV - RRR S1S2  Resp - CTA B/L   Abdomen - soft, NTND, +BS, rectal exam done prior with guaiac positive brown stool with sari blood  Extremities - WWP  Neuro - AAO x 3      LABS                        7.1    5.1   )-----------( 199      ( 06 Feb 2017 22:32 )             20.7     06 Feb 2017 20:49    142    |  106    |  24     ----------------------------<  100    3.8     |  26     |  1.23     Ca    8.5        06 Feb 2017 20:49    TPro  6.4    /  Alb  3.4    /  TBili  0.3    /  DBili  x      /  AST  12     /  ALT  24     /  AlkPhos  61     06 Feb 2017 20:49    PT/INR - ( 06 Feb 2017 20:49 )   PT: 11.4 sec;   INR: 1.03          PTT - ( 06 Feb 2017 20:49 )  PTT:27.5 sec            IMAGING   EKG - NSR

## 2017-02-07 NOTE — CONSULT NOTE ADULT - ASSESSMENT
51 year old male with hx of GERD (treated with lifestyle modification) with melena x1 day and elevated BUN.  S/P EGD with multiple gastric and duodenal ulcers s/p hemoclip placement of oozing ulcer Pt re-admitted for repeat episodes of melena and drop in hgb concerning for rebleed.  Plan for EGD today.     1. UGIB   -IV PPI  -NPO  -Monitor CBCs, hgb >8  -Plan for EGD today  -Stool H. Pylori Iga elevated -- await treatment pending biopsy results  -Consider ZES work up pending biopsy results.

## 2017-02-07 NOTE — PROGRESS NOTE ADULT - PROBLEM SELECTOR PLAN 1
Patient w/ recent UGIB s/p clip. Orthostatics negative in ED. Rectal + gross blood with brown stool. No blood BM's since 02/06 2pm. s/p IVF with 1uPRBC. Hb stable at  8.2. Had EGD today, no active bleeding, clip still in place.  Multiple non-bleeding duodenal ulcers w/ antibodies against H. pylori.   - c/w Protonix BID  - Maintain active T&S  - Trend H/H  - If pt bleeds again, will do capsule study   - Will treat w/ triple therapy for H pylori for 14 days: Metronidazole 500 mg PO BID (2/2 penicillin allergy) + Clarithromycin 500 mg BID (ID already approved) + PPI   - Clear liquid diet

## 2017-02-08 DIAGNOSIS — Z88.0 ALLERGY STATUS TO PENICILLIN: ICD-10-CM

## 2017-02-08 DIAGNOSIS — D62 ACUTE POSTHEMORRHAGIC ANEMIA: ICD-10-CM

## 2017-02-08 DIAGNOSIS — K21.9 GASTRO-ESOPHAGEAL REFLUX DISEASE WITHOUT ESOPHAGITIS: ICD-10-CM

## 2017-02-08 DIAGNOSIS — E86.0 DEHYDRATION: ICD-10-CM

## 2017-02-08 DIAGNOSIS — F19.21 OTHER PSYCHOACTIVE SUBSTANCE DEPENDENCE, IN REMISSION: ICD-10-CM

## 2017-02-08 DIAGNOSIS — F43.10 POST-TRAUMATIC STRESS DISORDER, UNSPECIFIED: ICD-10-CM

## 2017-02-08 DIAGNOSIS — K92.1 MELENA: ICD-10-CM

## 2017-02-08 DIAGNOSIS — K26.4 CHRONIC OR UNSPECIFIED DUODENAL ULCER WITH HEMORRHAGE: ICD-10-CM

## 2017-02-08 DIAGNOSIS — E78.5 HYPERLIPIDEMIA, UNSPECIFIED: ICD-10-CM

## 2017-02-08 DIAGNOSIS — R00.0 TACHYCARDIA, UNSPECIFIED: ICD-10-CM

## 2017-02-08 DIAGNOSIS — F10.21 ALCOHOL DEPENDENCE, IN REMISSION: ICD-10-CM

## 2017-02-08 DIAGNOSIS — K25.9 GASTRIC ULCER, UNSPECIFIED AS ACUTE OR CHRONIC, WITHOUT HEMORRHAGE OR PERFORATION: ICD-10-CM

## 2017-02-08 DIAGNOSIS — Z91.5 PERSONAL HISTORY OF SELF-HARM: ICD-10-CM

## 2017-02-08 DIAGNOSIS — Z56.0 UNEMPLOYMENT, UNSPECIFIED: ICD-10-CM

## 2017-02-08 LAB
ANION GAP SERPL CALC-SCNC: 6 MMOL/L — LOW (ref 9–16)
BUN SERPL-MCNC: 11 MG/DL — SIGNIFICANT CHANGE UP (ref 7–23)
CALCIUM SERPL-MCNC: 7.8 MG/DL — LOW (ref 8.5–10.5)
CHLORIDE SERPL-SCNC: 110 MMOL/L — HIGH (ref 96–108)
CO2 SERPL-SCNC: 27 MMOL/L — SIGNIFICANT CHANGE UP (ref 22–31)
CREAT SERPL-MCNC: 1.04 MG/DL — SIGNIFICANT CHANGE UP (ref 0.5–1.3)
GLUCOSE SERPL-MCNC: 85 MG/DL — SIGNIFICANT CHANGE UP (ref 70–99)
HCT VFR BLD CALC: 24.4 % — LOW (ref 39–50)
HCT VFR BLD CALC: 28.6 % — LOW (ref 39–50)
HGB BLD-MCNC: 8 G/DL — LOW (ref 13–17)
HGB BLD-MCNC: 9.4 G/DL — LOW (ref 13–17)
MAGNESIUM SERPL-MCNC: 2.1 MG/DL — SIGNIFICANT CHANGE UP (ref 1.6–2.4)
MCHC RBC-ENTMCNC: 28.8 PG — SIGNIFICANT CHANGE UP (ref 27–34)
MCHC RBC-ENTMCNC: 29.2 PG — SIGNIFICANT CHANGE UP (ref 27–34)
MCHC RBC-ENTMCNC: 32.8 G/DL — SIGNIFICANT CHANGE UP (ref 32–36)
MCHC RBC-ENTMCNC: 32.9 G/DL — SIGNIFICANT CHANGE UP (ref 32–36)
MCV RBC AUTO: 87.8 FL — SIGNIFICANT CHANGE UP (ref 80–100)
MCV RBC AUTO: 88.8 FL — SIGNIFICANT CHANGE UP (ref 80–100)
PHOSPHATE SERPL-MCNC: 3.4 MG/DL — SIGNIFICANT CHANGE UP (ref 2.5–4.5)
PLATELET # BLD AUTO: 175 K/UL — SIGNIFICANT CHANGE UP (ref 150–400)
PLATELET # BLD AUTO: 231 K/UL — SIGNIFICANT CHANGE UP (ref 150–400)
POTASSIUM SERPL-MCNC: 3.9 MMOL/L — SIGNIFICANT CHANGE UP (ref 3.5–5.3)
POTASSIUM SERPL-SCNC: 3.9 MMOL/L — SIGNIFICANT CHANGE UP (ref 3.5–5.3)
RBC # BLD: 2.78 M/UL — LOW (ref 4.2–5.8)
RBC # BLD: 3.22 M/UL — LOW (ref 4.2–5.8)
RBC # FLD: 15.2 % — SIGNIFICANT CHANGE UP (ref 10.3–16.9)
RBC # FLD: 15.7 % — SIGNIFICANT CHANGE UP (ref 10.3–16.9)
SODIUM SERPL-SCNC: 143 MMOL/L — SIGNIFICANT CHANGE UP (ref 135–145)
SURGICAL PATHOLOGY STUDY: SIGNIFICANT CHANGE UP
WBC # BLD: 3.7 K/UL — LOW (ref 3.8–10.5)
WBC # BLD: 4.3 K/UL — SIGNIFICANT CHANGE UP (ref 3.8–10.5)
WBC # FLD AUTO: 3.7 K/UL — LOW (ref 3.8–10.5)
WBC # FLD AUTO: 4.3 K/UL — SIGNIFICANT CHANGE UP (ref 3.8–10.5)

## 2017-02-08 PROCEDURE — 99232 SBSQ HOSP IP/OBS MODERATE 35: CPT | Mod: GC

## 2017-02-08 RX ORDER — PANTOPRAZOLE SODIUM 20 MG/1
40 TABLET, DELAYED RELEASE ORAL
Qty: 0 | Refills: 0 | Status: DISCONTINUED | OUTPATIENT
Start: 2017-02-08 | End: 2017-02-09

## 2017-02-08 RX ORDER — POTASSIUM CHLORIDE 20 MEQ
20 PACKET (EA) ORAL ONCE
Qty: 0 | Refills: 0 | Status: COMPLETED | OUTPATIENT
Start: 2017-02-08 | End: 2017-02-08

## 2017-02-08 RX ADMIN — PANTOPRAZOLE SODIUM 40 MILLIGRAM(S): 20 TABLET, DELAYED RELEASE ORAL at 07:20

## 2017-02-08 RX ADMIN — Medication 20 MILLIEQUIVALENT(S): at 11:46

## 2017-02-08 RX ADMIN — PANTOPRAZOLE SODIUM 40 MILLIGRAM(S): 20 TABLET, DELAYED RELEASE ORAL at 19:13

## 2017-02-08 SDOH — ECONOMIC STABILITY - INCOME SECURITY: UNEMPLOYMENT, UNSPECIFIED: Z56.0

## 2017-02-08 NOTE — PROGRESS NOTE ADULT - SUBJECTIVE AND OBJECTIVE BOX
PGY1 Transfer Acceptance Note     Hospital Course: Patient is a 52 y/o male with a PMHx of HLD, recently discharged from [facility] after UGIB s/p clipping duodenal ulcer on 02/02, presents with complaints of bright red watery stools and diaphoresis. Patient was admitted 2/2-2/6 for UGIB, EGD previously showing an "oozing ulcer" in the duodenum s/p clipping. H/H was stable during hospital admission, tolerating PO, and was discharged with outpatient GI follow. Shortly after discharge, patient woke up in the middle of the night diaphoretic, dizzy, and had an episode of large watery, dark diarrhea. Patient returned to bed and felt better. The following day at work on 2/7, patient had similar symptoms, but with dark, formed stools and bright red blood. Patient then came back to the ED, VS stable, orthostatics negative. Hg initially 7.9 upon arrival, dropped to 7.1. Rectal Exam done by ED attending showing brown stools with gross blood. Patient started on IVF, Protonix gtt, and given 1u PRBC. Admitted to [facility]. EGD performed on 2/7 which did not show any bleed and prior clip still in place. H&H stable throughout the day, goal Hgb>8, PPI BID, continue liquid diet for now. H.Pylori IgA elevated in stool however biopsy from antrum and body of stomach negative. Patient hemodynamically stable and to be transferred to regional medical floor.    Interval History: Patient seen and examined at bedside. Denies headache, lightheadedness, shortness of breath, chest pain, or bleeding. Reports that he has not had a BM since Monday, before he came to the ED. He is eager to eat solid foods.    VITAL SIGNS:  T(F): 98.4  HR: 66  BP: 124/69  RR: 18  SpO2: 98%  Wt(kg): --    PHYSICAL EXAM:  General: AAOx3, NAD, comfortable, pleasant  HEENT: PERRL, EOMI, no scleral icterus, no ptosis, MMM, no JVD, no LAD  Respiratory: CTA b/l, no wheezes, rales or rhonchi  Cardiovascular: Regular, +S1/S2, no murmur  Abdomen: Soft, NTND, normoactive bowel sounds, no rebound, no guarding, no suprapubic tenderness  Extremities: No cyanosis, no clubbing, no edema, pulses equal, no calf tenderness  Skin: No rashes  Neurological: CN II-XII grossly intact, follows commands, moves all extremities    MEDICATIONS  (STANDING):  sodium chloride 0.9%. 1000milliLiter(s) IV Continuous <Continuous>  pantoprazole    Tablet 40milliGRAM(s) Oral two times a day before meals    MEDICATIONS  (PRN):    Allergies    penicillin (Unknown)    Intolerances    LABS:                        8.0    3.7   )-----------( 175      ( 08 Feb 2017 07:51 )             24.4     08 Feb 2017 07:51    143    |  110    |  11     ----------------------------<  85     3.9     |  27     |  1.04     Ca    7.8        08 Feb 2017 07:51  Phos  3.4       08 Feb 2017 07:51  Mg     2.1       08 Feb 2017 07:51    TPro  6.4    /  Alb  3.4    /  TBili  0.3    /  DBili  x      /  AST  12     /  ALT  24     /  AlkPhos  61     06 Feb 2017 20:49    PT/INR - ( 07 Feb 2017 07:18 )   PT: 11.8 sec;   INR: 1.06       PTT - ( 07 Feb 2017 07:18 )  PTT:28.1 sec      EGD Results 2/7:  "1. No evidence of active or recent bleeding  2. moderate gastritis s/p biopsy to r/o H pylori  3. moderate duodenitis of the bulb and sweep w/ one hemoclip in place in clean-based duodenal ulcer from previous scope" PGY1 Transfer Acceptance Note     Hospital Course: Patient is a 52 y/o male with a PMHx of HLD, recently discharged from [facility] after UGIB s/p clipping duodenal ulcer on 02/02, presents with complaints of bright red watery stools and diaphoresis. Patient was admitted 2/2-2/5 for UGIB, EGD previously showing an "oozing ulcer" in the duodenum s/p clipping. H/H was stable during hospital admission, tolerating PO, and was discharged with outpatient GI follow. Shortly after discharge, patient woke up in the middle of the night diaphoretic, dizzy, and had an episode of large watery, dark diarrhea. Patient returned to bed and felt better. The following day at work on 2/7, patient had similar symptoms, but with dark, formed stools and bright red blood. Patient then came back to the ED, VS stable, orthostatics negative. Hg initially 7.9 upon arrival, dropped to 7.1. Rectal Exam done by ED attending showing brown stools with gross blood. Patient started on IVF, Protonix gtt, and given 1u PRBC. Admitted to [facility]. EGD performed on 2/7 which did not show any bleed and prior clip still in place. H&H stable throughout the day, goal Hgb>8, PPI BID, continue liquid diet for now. H.Pylori IgA elevated in stool however biopsy from antrum and body of stomach negative. Patient hemodynamically stable and to be transferred to regional medical floor.    Interval History: Patient seen and examined at bedside. Denies headache, lightheadedness, shortness of breath, chest pain, or bleeding. Reports that he has not had a BM since Monday, before he came to the ED. He is eager to eat solid foods.    VITAL SIGNS:  T(F): 98.4  HR: 66  BP: 124/69  RR: 18  SpO2: 98%  Wt(kg): --    PHYSICAL EXAM:  General: AAOx3, NAD, comfortable, pleasant  HEENT: PERRL, EOMI, no scleral icterus, no ptosis, MMM, no JVD, no LAD  Respiratory: CTA b/l, no wheezes, rales or rhonchi  Cardiovascular: Regular, +S1/S2, no murmur  Abdomen: Soft, NTND, normoactive bowel sounds, no rebound, no guarding, no suprapubic tenderness  Extremities: No cyanosis, no clubbing, no edema, pulses equal, no calf tenderness  Skin: No rashes  Neurological: CN II-XII grossly intact, follows commands, moves all extremities    MEDICATIONS  (STANDING):  sodium chloride 0.9%. 1000milliLiter(s) IV Continuous <Continuous>  pantoprazole    Tablet 40milliGRAM(s) Oral two times a day before meals    MEDICATIONS  (PRN):    Allergies    penicillin (Unknown)    Intolerances    LABS:                        8.0    3.7   )-----------( 175      ( 08 Feb 2017 07:51 )             24.4     08 Feb 2017 07:51    143    |  110    |  11     ----------------------------<  85     3.9     |  27     |  1.04     Ca    7.8        08 Feb 2017 07:51  Phos  3.4       08 Feb 2017 07:51  Mg     2.1       08 Feb 2017 07:51    TPro  6.4    /  Alb  3.4    /  TBili  0.3    /  DBili  x      /  AST  12     /  ALT  24     /  AlkPhos  61     06 Feb 2017 20:49    PT/INR - ( 07 Feb 2017 07:18 )   PT: 11.8 sec;   INR: 1.06       PTT - ( 07 Feb 2017 07:18 )  PTT:28.1 sec      EGD Results 2/7:  "1. No evidence of active or recent bleeding  2. moderate gastritis s/p biopsy to r/o H pylori  3. moderate duodenitis of the bulb and sweep w/ one hemoclip in place in clean-based duodenal ulcer from previous scope"

## 2017-02-08 NOTE — PROGRESS NOTE ADULT - PROBLEM SELECTOR PLAN 2
- Pt reports that he has not taken Crestor in the last 6 months - Pt reports that he has not taken Crestor in the last 6 months, suggest

## 2017-02-08 NOTE — PROGRESS NOTE ADULT - PROBLEM SELECTOR PLAN 4
IVF, NS @ 125mL/hr until patient advanced diet   monitor replete lytes to keep K>4 and Mg>2  Clear liquid diet

## 2017-02-08 NOTE — PROGRESS NOTE ADULT - ASSESSMENT
52 y/o male, recently discharged from [facility] after having UGIB 2/2 multiple duodenal/gastric ulcer (forest class IB) s/p clipping, returns to ED with similar complaints of bright red blood in stool with a decreasing Hg that eventually stabilized. EGD unremarkable, H. Pylori antigen in stool elevated however biopsy results negative.

## 2017-02-08 NOTE — PROGRESS NOTE ADULT - PROBLEM SELECTOR PLAN 1
Patient w/ recent UGIB s/p clip. Had EGD 2/7 no active bleeding, clip still in place.  Multiple non-bleeding duodenal ulcers w/antibodies against H. pylori in stool   - GI following, recs appreciated  - c/w Protonix BID  - Maintain active T&S  - Trend H/H  - If pt bleeds again, will do capsule study   - Will wait for biopsy resukt to treat w/ triple therapy for H pylori for 14 days: Metronidazole 500 mg PO BID (2/2 penicillin allergy) + Clarithromycin 500 mg BID (ID already approved) + PPI   - Clear liquid diet for another 24 hours Patient w/ recent UGIB s/p clip. Had EGD 2/7 no active bleeding, clip still in place.  Multiple non-bleeding duodenal ulcers w/antibodies against H. pylori in stool   - GI following, recs appreciated  - c/w Protonix BID  - Maintain active T&S  - Trend H/H  - If pt bleeds again, will do capsule study   - Biopsy result negative, will not treat H. Pylori  - Clear liquid diet for another 24 hours

## 2017-02-08 NOTE — PROGRESS NOTE ADULT - ASSESSMENT
51 year old male with hx of GERD (treated with lifestyle modification) with melena x1 day and elevated BUN.  S/P EGD with multiple gastric and duodenal ulcers s/p hemoclip placement of oozing ulcer Pt re-admitted for repeat episodes of melena and drop in hgb concerning for rebleed. Repeat EGD showing well healing ulceration. Previously clipped ulcer site healing; no evidence of oozing or bleeding, but given the drop in hgb + melena will treat pt for previously noted Kevin IB ulcers (10-27% chance of rebleed).  Will continue intensive PPI therapy.     1. UGIB (likely 2/2 to previously noted ulcerations)  -IV PPI BID for total of 72 hours   -Clear liquid diet for total of 48 hours   -Monitor CBCs, hgb >8  -Stool H. Pylori Iga elevated -- await treatment pending biopsy results  -Consider ZES work up pending biopsy results.     GI following. 51 year old male with hx of GERD (treated with lifestyle modification) with melena x1 day and elevated BUN.  S/P EGD with multiple gastric and duodenal ulcers s/p hemoclip placement of oozing ulcer Pt re-admitted for repeat episodes of melena and drop in hgb concerning for rebleed. Repeat EGD showing well healing ulceration. Previously clipped ulcer site healing; no evidence of oozing or bleeding, but given the drop in hgb + melena will treat pt for previously noted Kevin IB ulcers (10-27% chance of rebleed).  Will continue intensive PPI therapy.     1. UGIB (likely 2/2 to previously noted ulcerations)  -PPI BID   -Clear liquid diet for total of 48 hours   -Monitor CBCs, hgb >8  -Stool H. Pylori Iga elevated -- await treatment pending biopsy results  -Consider ZES work up pending biopsy results.     GI following.

## 2017-02-08 NOTE — PROGRESS NOTE ADULT - PROBLEM SELECTOR PLAN 1
Patient w/ recent UGIB s/p clip. Had EGD 2/7 no active bleeding, clip still in place.  Multiple non-bleeding duodenal ulcers w/antibodies against H. pylori in stool. 2 biopsy samples negative for H. pylori  - GI following, recs appreciated  - c/w Protonix 40 mg PO BID  - Maintain active T&S  - Trend H/H q12 for now  - As per GI, If pt bleeds again, will do capsule study   - Biopsy result negative, will not treat H. Pylori  - If 4 pm CBC is stable, patient diet can be advanced

## 2017-02-08 NOTE — PROGRESS NOTE ADULT - ATTENDING COMMENTS
Readmitted for GIB, s/p EGD w/ no active bleeding, stable H/H, cont to monitor and f/up GI recs. C/w PPI.

## 2017-02-08 NOTE — PROGRESS NOTE ADULT - PROBLEM SELECTOR PLAN 4
IVF  monitor replete lytes   Clear liquid diet    Dispo: Step down to [facility] IVF - NS at 125mL/hr until patient advanced diet.   monitor replete lytes to keep K>4 and Mg>2  Clear liquid diet    Dispo: Step down to [facility]

## 2017-02-08 NOTE — PROGRESS NOTE ADULT - ASSESSMENT
52 y/o male, recently discharged from [facility] after having UGIB 2/2 multiple duodenal/gastric ulcer(forest class IB) s/p clipping, returns to ED with similar complaints of bright red blood in stool with a decreasing Hg that eventually stabilized. EGD unremarkable, currently awaiting H. Pylori biopsy results given elevated H. Pylori antibody in stool. 50 y/o male, recently discharged from [facility] after having UGIB 2/2 multiple duodenal/gastric ulcer(forest class IB) s/p clipping, returns to ED with similar complaints of bright red blood in stool with a decreasing Hg that eventually stabilized. EGD unremarkable, H. Pylori antigen in stool elevated however biopsy results negative. .

## 2017-02-08 NOTE — PROGRESS NOTE ADULT - SUBJECTIVE AND OBJECTIVE BOX
Pt seen and examined at bedside. No acute overnight events. Pt denies melena or brbpr. No abd pain.       MEDICATIONS:  MEDICATIONS  (STANDING):  sodium chloride 0.9%. 1000milliLiter(s) IV Continuous <Continuous>  pantoprazole  Injectable 40milliGRAM(s) IV Push two times a day    MEDICATIONS  (PRN):      Allergies    penicillin (Unknown)    Intolerances        Vital Signs Last 24 Hrs  T(C): 36.4, Max: 36.4 (02-08 @ 09:21)  T(F): 97.6, Max: 97.6 (02-08 @ 09:21)  HR: 58 (56 - 60)  BP: 115/58 (94/53 - 122/57)  BP(mean): 82 (68 - 84)  RR: 18 (16 - 18)  SpO2: 99% (98% - 99%)  I & Os for 24h ending 02-08 @ 07:00  =============================================  IN: 1762 ml / OUT: 3200 ml / NET: -1438 ml    I & Os for current day (as of 02-08 @ 09:35)  =============================================  IN: 0 ml / OUT: 400 ml / NET: -400 ml      PHYSICAL EXAM:    General: Well developed; well nourished; in no acute distress  HEENT: MMM, conjunctiva and sclera clear  Gastrointestinal: Soft non-tender non-distended; Normal bowel sounds;  LABS:      CBC Full  -  ( 08 Feb 2017 07:51 )  WBC Count : 3.7 K/uL  Hemoglobin : 8.0 g/dL  Hematocrit : 24.4 %  Platelet Count - Automated : 175 K/uL  Mean Cell Volume : 87.8 fL  Mean Cell Hemoglobin : 28.8 pg  Mean Cell Hemoglobin Concentration : 32.8 g/dL      08 Feb 2017 07:51    143    |  110    |  11     ----------------------------<  85     3.9     |  27     |  1.04     Ca    7.8        08 Feb 2017 07:51  Phos  3.4       08 Feb 2017 07:51  Mg     2.1       08 Feb 2017 07:51    TPro  6.4    /  Alb  3.4    /  TBili  0.3    /  DBili  x      /  AST  12     /  ALT  24     /  AlkPhos  61     06 Feb 2017 20:49    PT/INR - ( 07 Feb 2017 07:18 )   PT: 11.8 sec;   INR: 1.06          PTT - ( 07 Feb 2017 07:18 )  PTT:28.1 sec                  RADIOLOGY & ADDITIONAL STUDIES (The following images were personally reviewed):

## 2017-02-08 NOTE — PROGRESS NOTE ADULT - SUBJECTIVE AND OBJECTIVE BOX
Overnight Events: TORIBIO, overnight Hgb was stable at 8.4    Subjective: Patient seen and examined at bedside. Patient feeling well, as per patient and night    [OBJECTIVE]:    Vital Signs:  T(F): , Max: 97.6 (02-08 @ 09:21)  HR:  (56 - 60)  BP:  (94/53 - 122/57)  BP(mean):  (68 - 84)  RR:  (16 - 18)  SpO2:  (98% - 99%)  Wt(kg): --  CVP(cm H2O): --    I & Os for 24h ending 02-08 @ 07:00  =============================================  IN: 1762 ml / OUT: 3200 ml / NET: -1438 ml    I & Os for current day (as of 02-08 @ 10:24)  =============================================  IN: 0 ml / OUT: 1125 ml / NET: -1125 ml    CAPILLARY BLOOD GLUCOSE      Physcial Exam:  T(F): 97.6  HR: 58  BP: 115/58  RR: 18  SpO2: 99%  Wt(kg): --    General: AO x 3, NAD, Comfortable, Pleasant, Anxious, Agitated, Ill, Frail, Cachectic, Resp distress  HEENT: PERRL/ EOMI, no scleral icterus, no ptosis, MMM, JVD, no thyromegaly  Respiratory: CTA b/l, no wheezes, rales or rhonchi  Cardiovascular: Regular, +S1 + S2  Abdomen: Soft, NTND, normoactive bowel sounds, no rebound, no guarding, no suprapubic tenderness  Extremities: No cyanosis, no clubbing, no edema, pulses equal, no calf tenderness  Skin: No rashes  Lymphatic: No cervical/supraclavicular LAD  Neurological: CN II-XII grossly intact, follows commands, moves all extremities    Medications:  MEDICATIONS  (STANDING):  sodium chloride 0.9%. 1000milliLiter(s) IV Continuous <Continuous>  pantoprazole  Injectable 40milliGRAM(s) IV Push two times a day  potassium chloride   Powder 20milliEquivalent(s) Oral once    MEDICATIONS  (PRN):      Allergies:  Allergies    penicillin (Unknown)    Intolerances        Labs:                        8.0    3.7   )-----------( 175      ( 08 Feb 2017 07:51 )             24.4     08 Feb 2017 07:51    143    |  110    |  11     ----------------------------<  85     3.9     |  27     |  1.04     Ca    7.8        08 Feb 2017 07:51  Phos  3.4       08 Feb 2017 07:51  Mg     2.1       08 Feb 2017 07:51    TPro  6.4    /  Alb  3.4    /  TBili  0.3    /  DBili  x      /  AST  12     /  ALT  24     /  AlkPhos  61     06 Feb 2017 20:49    PT/INR - ( 07 Feb 2017 07:18 )   PT: 11.8 sec;   INR: 1.06          PTT - ( 07 Feb 2017 07:18 )  PTT:28.1 sec      Radiology and other tests: PGY-1 TRANSFER NOTE  Hospital Course: Patient is a 52 y/o male with a PMHx of HLD, recently discharged from [facility] after UGIB s/p clipping on 02/02, presents with complaints of bright red watery stools and diaphoresis. Patient was admitted 2/2-2/6 for UGIB, EGD previously showing an "oozing ulcer" s/p clipping. H/H was stable during hospital admission, tolerating PO, and was discharged with outpatient GI follow. Since discharge, patient woke up in the middle of the night diaphoretic, dizzy, and had an episode of large watery, dark diarrhea. Patient returned to bed and felt better. The following day at work, patient had similar symptoms, but now with formed stools with bright red blood. In the ED, VS stable, orthostatics negative. Hg initially 7.9 upon arrival, dropped to 7.1. Rectal Exam done by ED attending showing brown stools with gross blood. Patient started on IVF, Protonix gtt, and given 1u PRBC. Admitted to [facility].  Had EGD on 2/7 which did not show any bleed and prior clip still in place. Will continue to monitor H&H BID, goal Hgb>8, PPI BID, continue liquid diet for total of  48 hours, await biopsy results to treat H. Pylori (H.Pylori IgA elevated in stool). Patient hemodynamically stable, can be transferred to regional medical floor for continued convalescence.     Overnight Events: TORIBIO, overnight Hgb was stable at 8.4    Subjective: Patient seen and examined at bedside. Patient feeling well, as per patient and night nurse no event overnight. Patient states that he has not had a bowel movement, denies any lightheadedness, changes in vision, SOB, chest pain, palpitations, cough, fevers, chills, N/V/D, HA, fatigue, or other discomforts. ROS otherwise negative. Patient states that he has been tolerating clear diet well.       [OBJECTIVE]:    Vital Signs:  T(F): , Max: 97.6 (02-08 @ 09:21)  HR:  (56 - 60)  BP:  (94/53 - 122/57)  BP(mean):  (68 - 84)  RR:  (16 - 18)  SpO2:  (98% - 99%)  Wt(kg): --  CVP(cm H2O): --    I & Os for 24h ending 02-08 @ 07:00  =============================================  IN: 1762 ml / OUT: 3200 ml / NET: -1438 ml    I & Os for current day (as of 02-08 @ 10:24)  =============================================  IN: 0 ml / OUT: 1125 ml / NET: -1125 ml    CAPILLARY BLOOD GLUCOSE      Physcial Exam:  T(F): 97.6  HR: 58  BP: 115/58  RR: 18  SpO2: 99%  Wt(kg): --    General: AO x 3, NAD, Comfortable, Pleasant  HEENT: PERRL/ EOMI, no scleral icterus, no ptosis, MMM, - JVD  Respiratory: CTA b/l, no wheezes, rales or rhonchi  Cardiovascular: Regular, +S1 + S2  Abdomen: Soft, NTND, normoactive bowel sounds, no rebound, no guarding, no suprapubic tenderness  Extremities: No cyanosis, no clubbing, no edema, pulses equal, no calf tenderness  Skin: No rashes  Lymphatic: No cervical/supraclavicular LAD  Neurological: CN II-XII grossly intact, follows commands, moves all extremities    Medications:  MEDICATIONS  (STANDING):  sodium chloride 0.9%. 1000milliLiter(s) IV Continuous <Continuous>  pantoprazole  Injectable 40milliGRAM(s) IV Push two times a day  potassium chloride   Powder 20milliEquivalent(s) Oral once    MEDICATIONS  (PRN):      Allergies:  Allergies    penicillin (Unknown)    Intolerances        Labs:                        8.0    3.7   )-----------( 175      ( 08 Feb 2017 07:51 )             24.4     08 Feb 2017 07:51    143    |  110    |  11     ----------------------------<  85     3.9     |  27     |  1.04     Ca    7.8        08 Feb 2017 07:51  Phos  3.4       08 Feb 2017 07:51  Mg     2.1       08 Feb 2017 07:51    TPro  6.4    /  Alb  3.4    /  TBili  0.3    /  DBili  x      /  AST  12     /  ALT  24     /  AlkPhos  61     06 Feb 2017 20:49    PT/INR - ( 07 Feb 2017 07:18 )   PT: 11.8 sec;   INR: 1.06          PTT - ( 07 Feb 2017 07:18 )  PTT:28.1 sec      Radiology and other tests:  H.pylori Antibody IgA (02.05.17 @ 22:58)    H.pylori Antibody IgA: 30.0 Units

## 2017-02-09 VITALS — WEIGHT: 220.46 LBS

## 2017-02-09 LAB
ANION GAP SERPL CALC-SCNC: 7 MMOL/L — LOW (ref 9–16)
BUN SERPL-MCNC: 10 MG/DL — SIGNIFICANT CHANGE UP (ref 7–23)
CALCIUM SERPL-MCNC: 8.5 MG/DL — SIGNIFICANT CHANGE UP (ref 8.5–10.5)
CHLORIDE SERPL-SCNC: 108 MMOL/L — SIGNIFICANT CHANGE UP (ref 96–108)
CO2 SERPL-SCNC: 27 MMOL/L — SIGNIFICANT CHANGE UP (ref 22–31)
CREAT SERPL-MCNC: 1.12 MG/DL — SIGNIFICANT CHANGE UP (ref 0.5–1.3)
GLUCOSE SERPL-MCNC: 80 MG/DL — SIGNIFICANT CHANGE UP (ref 70–99)
HCT VFR BLD CALC: 25.6 % — LOW (ref 39–50)
HGB BLD-MCNC: 8.5 G/DL — LOW (ref 13–17)
MAGNESIUM SERPL-MCNC: 2.3 MG/DL — SIGNIFICANT CHANGE UP (ref 1.6–2.4)
MCHC RBC-ENTMCNC: 29.1 PG — SIGNIFICANT CHANGE UP (ref 27–34)
MCHC RBC-ENTMCNC: 33.2 G/DL — SIGNIFICANT CHANGE UP (ref 32–36)
MCV RBC AUTO: 87.7 FL — SIGNIFICANT CHANGE UP (ref 80–100)
PLATELET # BLD AUTO: 199 K/UL — SIGNIFICANT CHANGE UP (ref 150–400)
POTASSIUM SERPL-MCNC: 3.7 MMOL/L — SIGNIFICANT CHANGE UP (ref 3.5–5.3)
POTASSIUM SERPL-SCNC: 3.7 MMOL/L — SIGNIFICANT CHANGE UP (ref 3.5–5.3)
RBC # BLD: 2.92 M/UL — LOW (ref 4.2–5.8)
RBC # FLD: 15.5 % — SIGNIFICANT CHANGE UP (ref 10.3–16.9)
SODIUM SERPL-SCNC: 142 MMOL/L — SIGNIFICANT CHANGE UP (ref 135–145)
WBC # BLD: 3.5 K/UL — LOW (ref 3.8–10.5)
WBC # FLD AUTO: 3.5 K/UL — LOW (ref 3.8–10.5)

## 2017-02-09 PROCEDURE — 86901 BLOOD TYPING SEROLOGIC RH(D): CPT

## 2017-02-09 PROCEDURE — 83735 ASSAY OF MAGNESIUM: CPT

## 2017-02-09 PROCEDURE — 80048 BASIC METABOLIC PNL TOTAL CA: CPT

## 2017-02-09 PROCEDURE — 99238 HOSP IP/OBS DSCHRG MGMT 30/<: CPT

## 2017-02-09 PROCEDURE — 99285 EMERGENCY DEPT VISIT HI MDM: CPT | Mod: 25

## 2017-02-09 PROCEDURE — 80053 COMPREHEN METABOLIC PANEL: CPT

## 2017-02-09 PROCEDURE — 85610 PROTHROMBIN TIME: CPT

## 2017-02-09 PROCEDURE — 93005 ELECTROCARDIOGRAM TRACING: CPT

## 2017-02-09 PROCEDURE — P9016: CPT

## 2017-02-09 PROCEDURE — 88305 TISSUE EXAM BY PATHOLOGIST: CPT

## 2017-02-09 PROCEDURE — 96374 THER/PROPH/DIAG INJ IV PUSH: CPT

## 2017-02-09 PROCEDURE — 85027 COMPLETE CBC AUTOMATED: CPT

## 2017-02-09 PROCEDURE — 36415 COLL VENOUS BLD VENIPUNCTURE: CPT

## 2017-02-09 PROCEDURE — 86850 RBC ANTIBODY SCREEN: CPT

## 2017-02-09 PROCEDURE — 85730 THROMBOPLASTIN TIME PARTIAL: CPT

## 2017-02-09 PROCEDURE — 36430 TRANSFUSION BLD/BLD COMPNT: CPT

## 2017-02-09 PROCEDURE — 86923 COMPATIBILITY TEST ELECTRIC: CPT

## 2017-02-09 PROCEDURE — 84100 ASSAY OF PHOSPHORUS: CPT

## 2017-02-09 PROCEDURE — 85025 COMPLETE CBC W/AUTO DIFF WBC: CPT

## 2017-02-09 PROCEDURE — 86900 BLOOD TYPING SEROLOGIC ABO: CPT

## 2017-02-09 RX ORDER — POTASSIUM CHLORIDE 20 MEQ
40 PACKET (EA) ORAL ONCE
Qty: 0 | Refills: 0 | Status: COMPLETED | OUTPATIENT
Start: 2017-02-09 | End: 2017-02-09

## 2017-02-09 RX ADMIN — SODIUM CHLORIDE 125 MILLILITER(S): 9 INJECTION INTRAMUSCULAR; INTRAVENOUS; SUBCUTANEOUS at 02:00

## 2017-02-09 RX ADMIN — PANTOPRAZOLE SODIUM 40 MILLIGRAM(S): 20 TABLET, DELAYED RELEASE ORAL at 06:21

## 2017-02-09 RX ADMIN — Medication 40 MILLIEQUIVALENT(S): at 09:53

## 2017-02-09 NOTE — DISCHARGE NOTE ADULT - MEDICATION SUMMARY - MEDICATIONS TO TAKE
I will START or STAY ON the medications listed below when I get home from the hospital:    Protonix 40 mg oral delayed release tablet  -- 1 tab(s) by mouth once a day  -- Indication: For GI bleed

## 2017-02-09 NOTE — PROGRESS NOTE ADULT - PROBLEM SELECTOR PLAN 1
Patient w/ recent UGIB s/p clip. Had EGD 2/7 no active bleeding, clip still in place.  Multiple non-bleeding duodenal ulcers w/antibodies against H. pylori in stool. 2 biopsy samples negative for H. pylori. As per GI, If pt bleeds again, will do capsule study.  - c/w Protonix 40 mg PO BID  - Maintain active T&S  - Trend H/H q12 for now  - Biopsy result negative, will not treat H. Pylori  - Diet advanced to mechanical soft

## 2017-02-09 NOTE — DIETITIAN INITIAL EVALUATION ADULT. - PROBLEM SELECTOR PLAN 1
Patient with 2 bloody bowel movement in 2 days, decrease in Hgb 7.1 and increase in BUN concerning for rebleed of previous clipped vessel. Orthostatics negative in ED. ROS + dizziness, +LAWSON, +Abdominal discomfort. Rectal + gross blood with brown stool. No blood BM's since 02/06 2pm. s/p IVF with 1uPRBC. GI consulted.   - c/w Protonix gtt  - f/u GI rec's. Will scope in AM   - f/u post transfusion CBC, and continue to get CBC q4-6h. Will transfuse if Hg < 7 - Maintain active T&S  - 2 large bore IV  - NPO   Dispo: Orthostatics negative, VS stable, no evidence of HD instability at this time.  However given high risk for rapid decompensation in the setting of acute blood loss patient to be monitored on telemetry.  Admit to [facility]

## 2017-02-09 NOTE — PROGRESS NOTE ADULT - ASSESSMENT
50 y/o male, recently discharged from [facility] after having UGIB 2/2 multiple duodenal/gastric ulcer (forest class IB) s/p clipping, returns to ED with similar complaints of bright red blood in stool with a decreasing Hg that eventually stabilized. EGD unremarkable, H. Pylori antigen in stool elevated however biopsy results negative.

## 2017-02-09 NOTE — DISCHARGE NOTE ADULT - PATIENT PORTAL LINK FT
“You can access the Follow[employer] Patient Portal, offered by [facility], by registering with the following website: [email or web address]”

## 2017-02-09 NOTE — PROGRESS NOTE ADULT - SUBJECTIVE AND OBJECTIVE BOX
Pt seen and examined at bedside. No acute overnight events. Pt resting in bed comfortably. No melena or brbpr.     MEDICATIONS:  MEDICATIONS  (STANDING):  pantoprazole    Tablet 40milliGRAM(s) Oral two times a day before meals  potassium chloride   Powder 40milliEquivalent(s) Oral once    MEDICATIONS  (PRN):      Allergies    penicillin (Unknown)    Intolerances        Vital Signs Last 24 Hrs  T(C): 36.1, Max: 36.9 (02-08 @ 13:35)  T(F): 97, Max: 98.4 (02-08 @ 13:35)  HR: 57 (57 - 68)  BP: 98/50 (98/50 - 135/78)  BP(mean): 88 (88 - 88)  RR: 16 (16 - 18)  SpO2: 97% (97% - 98%)  I & Os for 24h ending 02-09 @ 07:00  =============================================  IN: 4000 ml / OUT: 3775 ml / NET: 225 ml    I & Os for current day (as of 02-09 @ 08:55)  =============================================  IN: 125 ml / OUT: 0 ml / NET: 125 ml      PHYSICAL EXAM:    General: Well developed; well nourished; in no acute distress  HEENT: anicteric   Gastrointestinal: Soft non-tender non-distended; Normal bowel sounds; No hepatosplenomegaly      LABS:      CBC Full  -  ( 09 Feb 2017 06:07 )  WBC Count : 3.5 K/uL  Hemoglobin : 8.5 g/dL  Hematocrit : 25.6 %  Platelet Count - Automated : 199 K/uL  Mean Cell Volume : 87.7 fL  Mean Cell Hemoglobin : 29.1 pg  Mean Cell Hemoglobin Concentration : 33.2 g/dL  Auto Neutrophil # : x  Auto Lymphocyte # : x  Auto Monocyte # : x  Auto Eosinophil # : x  Auto Basophil # : x  Auto Neutrophil % : x  Auto Lymphocyte % : x  Auto Monocyte % : x  Auto Eosinophil % : x  Auto Basophil % : x    09 Feb 2017 06:05    142    |  108    |  10     ----------------------------<  80     3.7     |  27     |  1.12     Ca    8.5        09 Feb 2017 06:05  Phos  3.4       08 Feb 2017 07:51  Mg     2.3       09 Feb 2017 06:05                        RADIOLOGY & ADDITIONAL STUDIES (The following images were personally reviewed):

## 2017-02-09 NOTE — PROGRESS NOTE ADULT - ASSESSMENT
51 year old male with hx of GERD (treated with lifestyle modification) with melena x1 day and elevated BUN.  S/P EGD with multiple gastric and duodenal ulcers s/p hemoclip placement of oozing ulcer Pt re-admitted for repeat episodes of melena and drop in hgb concerning for rebleed. Repeat EGD showing well healing ulceration. Pt hgb stable and HD stable.     1. UGIB (likely 2/2 to previously noted ulcerations)  -PPI BID   -Advance diet as tolerated.    -Monitor CBCs, hgb >8  -Biopsy negative for H. pylori   -Pt will need outpatient follow up when stable for discharge.

## 2017-02-09 NOTE — DISCHARGE NOTE ADULT - CARE PROVIDER_API CALL
Yaya Connolly), Gastroenterology; Internal Medicine  [address] [place] [address]  Phone: [phone]  Fax: [phone]

## 2017-02-09 NOTE — DISCHARGE NOTE ADULT - PLAN OF CARE
Resolution of bleeding You were admitted for a GI bleed. Upon endoscopy, the clip placed during your previous endoscopy was still in place. No bleeding was observed during endoscopy. Continue to take Pantoprazole daily for stomach protection.

## 2017-02-09 NOTE — DIETITIAN INITIAL EVALUATION ADULT. - ENERGY NEEDS
Height: 187.96cm Weight: 100kg, IBW 86kg+/-10%, %%, BMI 28.5  Admission used to calculate energy needs due to pt's current body weight within % of IBW

## 2017-02-09 NOTE — DIETITIAN INITIAL EVALUATION ADULT. - NS AS NUTRI INTERV MEDICAL AND FOOD SUPPLEMENTS
Commercial beverage/Spoke w/ pt to take supplements while being cautious with PO intake to meet nutritional needs- Ensure Plus BID (700kcals, 26g protein)

## 2017-02-09 NOTE — DISCHARGE NOTE ADULT - ADDITIONAL INSTRUCTIONS
You are being discharged after experiencing a GI bleed. At a previous admission, it was found that you had multiple ulcers in the stomach and duodenum (the most proximal part of the intestines) and a bleeding ulcer had a clip placed. The bleed prior to this current admission likely originated from an ulcer as well, however a source was not found on endoscopy. After endoscopy, you were observed and your Hemoglobin remained stable between 8.5-9.5 and you tolerated eating well. Please take your Pantoprazole 40 mg daily by mouth for GI protection. Follow up with a gastroenterologist within 1-2 weeks of discharge (contact information is included in this paperwork).     If you have any symptoms such as but not limited to lightheadedness, dizziness, sweating, shortness of breath, palpitations, chest pain, black stools, or bleeding per rectum, please return to the emergency room.

## 2017-02-09 NOTE — DISCHARGE NOTE ADULT - HOSPITAL COURSE
Patient is a 50 y/o male with a PMHx of HLD, recently discharged from [facility] after UGIB s/p clipping duodenal ulcer on 02/02, presents with complaints of bright red watery stools and diaphoresis. Patient was admitted 2/2-2/5 for UGIB, EGD previously showing an "oozing ulcer" in the duodenum s/p clipping. H/H was stable during hospital admission, tolerating PO, and was discharged with outpatient GI follow. Shortly after discharge, patient woke up in the middle of the night diaphoretic, dizzy, and had an episode of large watery, dark diarrhea. Patient returned to bed and felt better. The following day at work on 2/7, patient had similar symptoms, but with dark, formed stools and bright red blood. Patient then came back to the ED, VS stable, orthostatics negative. Hg initially 7.9 upon arrival, dropped to 7.1. Rectal Exam done by ED attending showing brown stools with gross blood. Patient started on IVF, Protonix gtt, and given 1u PRBC. Admitted to [facility]. EGD performed on 2/7 which did not show any bleed and prior clip still in place. H&H stable throughout the day, goal Hgb>8, PPI BID. H.Pylori IgA elevated in stool however biopsy from antrum and body of stomach negative. Patient transferred to [facility] on 2/8 and repeat CBC stable. Patient started on mechanical soft diet and tolerating well. Pt stable for DC on 2/9 and instructed to follow up as an outpatient. Sent home with PPI. Patient is a 50 y/o male with a PMHx of HLD, recently discharged from [facility] after UGIB s/p clipping duodenal ulcer on 02/02, presents with complaints of bright red watery stools and diaphoresis. Patient was admitted 2/2-2/5 for UGIB, EGD previously showing an "oozing ulcer" in the duodenum s/p clipping. H/H was stable during hospital admission, tolerating PO, and was discharged with outpatient GI follow. Shortly after discharge, patient woke up in the middle of the night diaphoretic, dizzy, and had an episode of large watery, dark diarrhea. Patient returned to bed and felt better. The following day at work on 2/7, patient had similar symptoms, but with dark, formed stools and bright red blood. Patient then came back to the ED, VS stable, orthostatics negative. Hg initially 7.9 upon arrival, dropped to 7.1. Rectal Exam done by ED attending showing brown stools with gross blood. Patient started on IVF, Protonix gtt, and given 1u PRBC. Admitted to [facility]. EGD performed on 2/7 which did not show any bleed and prior clip still in place. H&H stable throughout the day, goal Hgb>8, PPI BID. H.Pylori IgA elevated in stool however biopsy from antrum and body of stomach negative. Patient transferred to [facility] on 2/8 and repeat CBC stable. Patient started on mechanical soft diet and tolerating well. Pt stable for DC on 2/9 and instructed to follow up as an outpatient. Sent home with PPI.    Attending note on day of discharge: Seen and examined by me, no complaints, tolerating PO, no BRBPR. VSS, abd soft, NT/ND. Stable anemia. Apprec GI recs. Medically stable for discharge home today with GI follow up. C/w PPI given recent GIB.

## 2017-02-09 NOTE — DIETITIAN INITIAL EVALUATION ADULT. - NS AS NUTRI INTERV ED CONTENT
edu over adequate nutrition and brief low fiber diet/Nutrition relationship to health/disease/Recommended modifications/Purpose of the nutrition education

## 2017-02-09 NOTE — DIETITIAN INITIAL EVALUATION ADULT. - NS AS NUTRI INTERV MEALS SNACK
General/healthful diet/Low fiber diet until GI symptoms are no longer observed, then increase fiber slowly./Fiber - modified diet

## 2017-02-09 NOTE — PROGRESS NOTE ADULT - SUBJECTIVE AND OBJECTIVE BOX
Overnight Events: Pt denies overnight events, denies having a BM, n, v, dizziness, HA.    VITAL SIGNS:  T(F): 97  HR: 57  BP: 98/50  RR: 16  SpO2: 97%  Wt(kg): --    PHYSICAL EXAM:  General: NAD, comfortable  Eyes: PERRL, no scleral icterus  Resp: CTA b/l  CVS: RRR, no murmur  Abd: +bowel sounds x 4, no tympany, soft, non tender on palpation  Extremities: no pedal edema, 2+ radial pulses, no calf tenderness b/l on palpation  Neuro: A & O x 3, follows commands, moves all extremities    MEDICATIONS  (STANDING):  sodium chloride 0.9%. 1000milliLiter(s) IV Continuous <Continuous>  pantoprazole    Tablet 40milliGRAM(s) Oral two times a day before meals  potassium chloride   Powder 40milliEquivalent(s) Oral once    MEDICATIONS  (PRN):    Allergies    penicillin (Unknown)    Intolerances      LABS:                        8.5    3.5   )-----------( 199      ( 09 Feb 2017 06:07 )             25.6     09 Feb 2017 06:05    142    |  108    |  10     ----------------------------<  80     3.7     |  27     |  1.12     Ca    8.5        09 Feb 2017 06:05  Phos  3.4       08 Feb 2017 07:51  Mg     2.3       09 Feb 2017 06:05            RADIOLOGY & ADDITIONAL TESTS:

## 2017-02-09 NOTE — DIETITIAN INITIAL EVALUATION ADULT. - OTHER INFO
50 YO M admitted w/ GI hemorrage. S/p EGD (2/7)- no active bleeding, hemoclip still in place from ulcer. 52 YO M admitted w/ GI hemorrage. S/p EGD (2/7)- no active bleeding, hemoclip still in place from ulcer. PTA pt reports cooking most meals at home, limits red meat consumption to mostly lean proteins, vegetables and fruits. Pt currently ordered for Mechanical soft diet; previously on CL LIQ. Pt reports good appetite; consumed >75% of just breakfast meal w/ good tolerance. However, pt reports wanting to be cautious and mostly consume liquids until he feels his symptoms have subsided. No N/V/D/C, pain w/ swallowing or mechanical issues. Last BM (2/6). Skin WDL.

## 2017-02-09 NOTE — DISCHARGE NOTE ADULT - CARE PLAN
Principal Discharge DX:	GI bleed  Goal:	Resolution of bleeding  Instructions for follow-up, activity and diet:	You were admitted for a GI bleed. Upon endoscopy, the clip placed during your previous endoscopy was still in place. No bleeding was observed during endoscopy. Continue to take Pantoprazole daily for stomach protection.  Secondary Diagnosis:	PUD (peptic ulcer disease)

## 2017-02-13 DIAGNOSIS — E78.5 HYPERLIPIDEMIA, UNSPECIFIED: ICD-10-CM

## 2017-02-13 DIAGNOSIS — Z88.0 ALLERGY STATUS TO PENICILLIN: ICD-10-CM

## 2017-02-13 DIAGNOSIS — K29.80 DUODENITIS WITHOUT BLEEDING: ICD-10-CM

## 2017-02-13 DIAGNOSIS — K29.70 GASTRITIS, UNSPECIFIED, WITHOUT BLEEDING: ICD-10-CM

## 2017-02-13 DIAGNOSIS — K21.9 GASTRO-ESOPHAGEAL REFLUX DISEASE WITHOUT ESOPHAGITIS: ICD-10-CM

## 2017-02-13 DIAGNOSIS — K92.2 GASTROINTESTINAL HEMORRHAGE, UNSPECIFIED: ICD-10-CM

## 2017-02-13 DIAGNOSIS — K27.9 PEPTIC ULCER, SITE UNSPECIFIED, UNSPECIFIED AS ACUTE OR CHRONIC, WITHOUT HEMORRHAGE OR PERFORATION: ICD-10-CM

## 2017-02-16 PROBLEM — Z00.00 ENCOUNTER FOR PREVENTIVE HEALTH EXAMINATION: Status: ACTIVE | Noted: 2017-02-16

## 2017-02-23 ENCOUNTER — APPOINTMENT (OUTPATIENT)
Dept: GASTROENTEROLOGY | Facility: CLINIC | Age: 52
End: 2017-02-23

## 2017-02-23 VITALS
TEMPERATURE: 98.1 F | HEIGHT: 76 IN | RESPIRATION RATE: 16 BRPM | DIASTOLIC BLOOD PRESSURE: 78 MMHG | SYSTOLIC BLOOD PRESSURE: 135 MMHG | OXYGEN SATURATION: 98 % | HEART RATE: 76 BPM | BODY MASS INDEX: 27.16 KG/M2 | WEIGHT: 223 LBS

## 2017-02-23 DIAGNOSIS — B96.81 GASTRITIS, UNSPECIFIED, W/OUT BLEEDING: ICD-10-CM

## 2017-02-23 DIAGNOSIS — K29.70 GASTRITIS, UNSPECIFIED, W/OUT BLEEDING: ICD-10-CM

## 2017-02-23 DIAGNOSIS — K25.9 GASTRIC ULCER, UNSPECIFIED AS ACUTE OR CHRONIC, W/OUT HEMORRHAGE OR PERFORATION: ICD-10-CM

## 2017-02-23 DIAGNOSIS — K26.9 DUODENAL ULCER, UNSPECIFIED AS ACUTE OR CHRONIC, W/OUT HEMORRHAGE OR PERFORATION: ICD-10-CM

## 2017-02-23 RX ORDER — PANTOPRAZOLE SODIUM 40 MG/1
40 GRANULE, DELAYED RELEASE ORAL
Refills: 0 | Status: ACTIVE | COMMUNITY

## 2017-02-23 RX ORDER — PANTOPRAZOLE 40 MG/1
40 TABLET, DELAYED RELEASE ORAL
Qty: 28 | Refills: 0 | Status: ACTIVE | COMMUNITY
Start: 2017-02-23 | End: 1900-01-01

## 2017-02-23 RX ORDER — ROSUVASTATIN CALCIUM 20 MG/1
20 TABLET, FILM COATED ORAL
Refills: 0 | Status: ACTIVE | COMMUNITY

## 2017-02-23 RX ORDER — METRONIDAZOLE 500 MG/1
500 TABLET ORAL TWICE DAILY
Qty: 28 | Refills: 0 | Status: ACTIVE | COMMUNITY
Start: 2017-02-23 | End: 1900-01-01

## 2017-02-23 RX ORDER — CLARITHROMYCIN 500 MG/1
500 TABLET, FILM COATED ORAL TWICE DAILY
Qty: 28 | Refills: 0 | Status: ACTIVE | COMMUNITY
Start: 2017-02-23 | End: 1900-01-01

## 2017-02-24 ENCOUNTER — TRANSCRIPTION ENCOUNTER (OUTPATIENT)
Age: 52
End: 2017-02-24

## 2017-03-31 ENCOUNTER — OTHER (OUTPATIENT)
Age: 52
End: 2017-03-31

## 2017-03-31 ENCOUNTER — LABORATORY RESULT (OUTPATIENT)
Age: 52
End: 2017-03-31

## 2017-04-03 LAB
ALBUMIN SERPL ELPH-MCNC: 4.4 G/DL
ALP BLD-CCNC: 72 U/L
ALT SERPL-CCNC: 28 U/L
ANION GAP SERPL CALC-SCNC: 12 MMOL/L
AST SERPL-CCNC: 28 U/L
BASOPHILS # BLD AUTO: 0.03 K/UL
BASOPHILS NFR BLD AUTO: 1.1 %
BILIRUB SERPL-MCNC: 0.2 MG/DL
BUN SERPL-MCNC: 17 MG/DL
CALCIUM SERPL-MCNC: 9.7 MG/DL
CHLORIDE SERPL-SCNC: 105 MMOL/L
CO2 SERPL-SCNC: 25 MMOL/L
CREAT SERPL-MCNC: 1.21 MG/DL
EOSINOPHIL # BLD AUTO: 0.03 K/UL
EOSINOPHIL NFR BLD AUTO: 1.1 %
GLUCOSE SERPL-MCNC: 90 MG/DL
HCT VFR BLD CALC: 38.7 %
HGB BLD-MCNC: 11.9 G/DL
IMM GRANULOCYTES NFR BLD AUTO: 0 %
LYMPHOCYTES # BLD AUTO: 1.09 K/UL
LYMPHOCYTES NFR BLD AUTO: 38.5 %
MAN DIFF?: NORMAL
MCHC RBC-ENTMCNC: 24.9 PG
MCHC RBC-ENTMCNC: 30.7 GM/DL
MCV RBC AUTO: 81.1 FL
MONOCYTES # BLD AUTO: 0.32 K/UL
MONOCYTES NFR BLD AUTO: 11.3 %
NEUTROPHILS # BLD AUTO: 1.36 K/UL
NEUTROPHILS NFR BLD AUTO: 48 %
PLATELET # BLD AUTO: 258 K/UL
POTASSIUM SERPL-SCNC: 4.7 MMOL/L
PROT SERPL-MCNC: 7.1 G/DL
RBC # BLD: 4.77 M/UL
RBC # FLD: 14.9 %
SODIUM SERPL-SCNC: 142 MMOL/L
WBC # FLD AUTO: 2.83 K/UL

## 2017-04-04 ENCOUNTER — OUTPATIENT (OUTPATIENT)
Dept: OUTPATIENT SERVICES | Facility: HOSPITAL | Age: 52
LOS: 1 days | End: 2017-04-04

## 2017-04-04 ENCOUNTER — APPOINTMENT (OUTPATIENT)
Dept: GASTROENTEROLOGY | Facility: HOSPITAL | Age: 52
End: 2017-04-04

## 2017-04-12 ENCOUNTER — APPOINTMENT (OUTPATIENT)
Dept: GASTROENTEROLOGY | Facility: CLINIC | Age: 52
End: 2017-04-12

## 2017-05-05 NOTE — CHART NOTE - NSCHARTNOTEFT_GEN_A_CORE
PGY-1 Overnight Update Note    At 4:45am, called by RN for sBP in low 90s, no tachycardia. 10pm CBC was 9.4, down from 10.2. Pt underwent EGD yesterday in which one bleeding ulcer was clipped by GI. Assessed patient at bedside. He denies chest pain, shortness of breath, lightheadedness, vision change. On exam, no conjunctival pallor, heart with regular rate/rhythm (no murmurs), and clear lung sounds (no rales/rhonchi, good air movement) while on maintenance fluids.    Plan: Will give 500cc NS bolus now and re-check BP in 1 hour.  - F/up GI recs in AM PGY-1 Overnight Update Note    At 4:45am, called by RN for sBP in low 90s (91/52 - checked 3x), no tachycardia. 10pm CBC was 9.4, down from 10.2. Pt underwent EGD yesterday in which one bleeding ulcer was clipped by GI. Assessed patient at bedside. He denies chest pain, shortness of breath, lightheadedness, vision change. On exam, no conjunctival pallor, heart with regular rate/rhythm (no murmurs), and clear lung sounds (no rales/rhonchi, good air movement) while on maintenance fluids.    Plan: Will give 500cc NS bolus now and re-check BP in 1 hour.  - F/up GI recs in AM independent

## 2017-06-10 ENCOUNTER — EMERGENCY (EMERGENCY)
Facility: HOSPITAL | Age: 52
LOS: 1 days | Discharge: HOME | End: 2017-06-10
Attending: EMERGENCY MEDICINE | Admitting: EMERGENCY MEDICINE
Payer: MEDICAID

## 2017-06-10 VITALS
TEMPERATURE: 98 F | RESPIRATION RATE: 18 BRPM | OXYGEN SATURATION: 95 % | DIASTOLIC BLOOD PRESSURE: 76 MMHG | HEART RATE: 83 BPM | SYSTOLIC BLOOD PRESSURE: 119 MMHG
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
## 2017-06-10 DIAGNOSIS — Z79.2 LONG TERM (CURRENT) USE OF ANTIBIOTICS: ICD-10-CM

## 2017-06-10 DIAGNOSIS — S61.313A LACERATION WITHOUT FOREIGN BODY OF LEFT MIDDLE FINGER WITH DAMAGE TO NAIL, INITIAL ENCOUNTER: ICD-10-CM

## 2017-06-10 DIAGNOSIS — E78.5 HYPERLIPIDEMIA, UNSPECIFIED: ICD-10-CM

## 2017-06-10 DIAGNOSIS — Z23 ENCOUNTER FOR IMMUNIZATION: ICD-10-CM

## 2017-06-10 DIAGNOSIS — Z79.899 OTHER LONG TERM (CURRENT) DRUG THERAPY: ICD-10-CM

## 2017-06-10 DIAGNOSIS — Z88.0 ALLERGY STATUS TO PENICILLIN: ICD-10-CM
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
## 2017-06-10 PROCEDURE — 90715 TDAP VACCINE 7 YRS/> IM: CPT

## 2017-06-10 PROCEDURE — 99284 EMERGENCY DEPT VISIT MOD MDM: CPT | Mod: 25

## 2017-06-10 PROCEDURE — 12001 RPR S/N/AX/GEN/TRNK 2.5CM/<: CPT

## 2017-06-10 PROCEDURE — 90471 IMMUNIZATION ADMIN: CPT

## 2017-06-10 PROCEDURE — 73120 X-RAY EXAM OF HAND: CPT

## 2017-06-10 PROCEDURE — 99283 EMERGENCY DEPT VISIT LOW MDM: CPT | Mod: 25

## 2017-06-10 PROCEDURE — 73120 X-RAY EXAM OF HAND: CPT | Mod: 26,LT

## 2017-06-10 RX ORDER — TETANUS TOXOID, REDUCED DIPHTHERIA TOXOID AND ACELLULAR PERTUSSIS VACCINE, ADSORBED 5; 2.5; 8; 8; 2.5 [IU]/.5ML; [IU]/.5ML; UG/.5ML; UG/.5ML; UG/.5ML
0.5 SUSPENSION INTRAMUSCULAR ONCE
Qty: 0 | Refills: 0 | Status: COMPLETED | OUTPATIENT
Start: 2017-06-10 | End: 2017-06-10

## 2017-06-10 RX ADMIN — TETANUS TOXOID, REDUCED DIPHTHERIA TOXOID AND ACELLULAR PERTUSSIS VACCINE, ADSORBED 0.5 MILLILITER(S): 5; 2.5; 8; 8; 2.5 SUSPENSION INTRAMUSCULAR at 23:11

## 2017-06-10 NOTE — ED ADULT NURSE NOTE - OBJECTIVE STATEMENT
pt to ER w/ report of cutting first digit of L. middle finger w/ knife.  Approx 1.5 cm lac noted through fingernail and along side of finger, no active bleeding noted.  Pt denies other medical problems.  Pt denies cp/sob/f/c/n/v.  Breathing unlabored, skin warm and dry.

## 2017-06-10 NOTE — ED ADULT TRIAGE NOTE - CHIEF COMPLAINT QUOTE
I cut my 3rd finger on my left hand with a knife in the kitchen by accident.  Tetanus status unknown.

## 2017-06-11 RX ORDER — PANTOPRAZOLE SODIUM 20 MG/1
1 TABLET, DELAYED RELEASE ORAL
Qty: 0 | Refills: 0 | COMMUNITY

## 2017-06-11 NOTE — ED PROVIDER NOTE - MEDICAL DECISION MAKING DETAILS
s/s as above, nvi- discussed with plastics over phone- sent pic, recommend dermabond to nail and fu in clinic. tetatnus utd, xr without foreign body, given laceration to hand, dc'd with abx.

## 2017-06-11 NOTE — ED PROVIDER NOTE - PHYSICAL EXAMINATION
CONSTITUTIONAL: Well appearing, well nourished, awake, alert and in no apparent distress.  HEENT: Head is atraumatic. Eyes clear bilaterally, normal EOMI. Airway patent.  CARDIAC: Normal rate, regular rhythm.  Heart sounds S1, S2.   RESPIRATORY: Breath sounds clear and equal bilaterally.  GASTROINTESTINAL: Abdomen soft, non-tender, no guarding.  MUSCULOSKELETAL: Spine appears normal, range of motion is not limited, no muscle or joint tenderness.   NEUROLOGICAL: Alert and oriented, no focal deficits, no motor or sensory deficits.  SKIN: Skin normal color for race, warm, dry and intact. No evidence of rash. linear   PSYCHIATRIC: Alert and oriented to person, place, time/situation. normal mood and affect. no apparent risk to self or others. CONSTITUTIONAL: Well appearing, well nourished, awake, alert and in no apparent distress.  HEENT: Head is atraumatic. Eyes clear bilaterally, normal EOMI. Airway patent.  CARDIAC: Normal rate, regular rhythm.  Heart sounds S1, S2.   RESPIRATORY: Breath sounds clear and equal bilaterally.  GASTROINTESTINAL: Abdomen soft, non-tender, no guarding.  MUSCULOSKELETAL: Spine appears normal, range of motion is not limited, no muscle or joint tenderness. L nail laceration over distal nail of third digit of L hand, nail laceration extends to middle of finger and across to skin approx 1cm. vertical cut to tip of finger on palmar surface 1cm approximating well. NVI.  NEUROLOGICAL: Alert and oriented, no focal deficits, no motor or sensory deficits.  SKIN: Skin normal color for race, warm, dry and intact. No evidence of rash. linear   PSYCHIATRIC: Alert and oriented to person, place, time/situation. normal mood and affect. no apparent risk to self or others.

## 2017-06-11 NOTE — ED PROVIDER NOTE - OBJECTIVE STATEMENT
pt to ed with complaints of L finger laceration over L third digit nail after cutting vegetables. no numbness, paresthesias, unknown last tetaunus. R handed. no other injuries. 51yo to ed with complaints of L finger laceration over L third digit nail after cutting vegetables. no numbness, paresthesias, unknown last tetanus. R handed. no other injuries.

## 2018-04-19 NOTE — ED PROVIDER NOTE - RESPIRATORY, MLM
Otc Regimen: Cerave hydrating wash, pt is currently just washing his face with water Detail Level: Zone Breaths sounds equal and clear b/l. No increased WOB, tachypnea, hypoxia, or accessory mm use. Pt speaks in full sentences.

## 2021-06-26 ENCOUNTER — TRANSCRIPTION ENCOUNTER (OUTPATIENT)
Age: 56
End: 2021-06-26

## 2021-09-02 NOTE — ED PROVIDER NOTE - CPE EDP CARDIAC NORM
What Type Of Note Output Would You Prefer (Optional)?: Standard Output Hpi Title: Evaluation of Skin Lesions How Severe Are Your Spot(S)?: mild normal...

## 2022-04-12 NOTE — ED ADULT NURSE NOTE - BED ASSIGNMENT RECEIVED
Pt cut left index finger on a saw at work today, bleeding is controlled and pulses are good and finger is warm to touch, about 3cm laceration to top of finger, able to move finger but states \"very painful\"   01:48

## 2024-06-20 NOTE — ED ADULT NURSE NOTE - THOUGHTS OF HOMICIDE/VIOLENCE TOWARDS OTHERS YN, MLM
Refill request received from [facility] for meds listed below.    LOV 9/21/2023  FOV 10/7/2024  Last Lab 9/21/2023   No

## 2025-01-29 NOTE — PROVIDER CONTACT NOTE (OTHER) - SITUATION
PATIENT AWARE   Pt ordered for IV Abx, but continually refuses IV. Allowed 1 attempt but then got agitated when inserting, so unsuccessful. Pt also refusing PO abx and vitamins b/c they "tear up my stomach."

## 2025-03-20 NOTE — PATIENT PROFILE ADULT. - AGENT'S NAME
bibems for intox.  found laying on floor.  pt endorses drinking half a beer and taking 1mg xanax.  Pt arousable, awake and alert.  denies any pain.  FS 86 in triage.   hx of seizures, non compliant.  nkda. Pennie Osman (E4) spontaneous